# Patient Record
Sex: FEMALE | Race: WHITE | NOT HISPANIC OR LATINO | Employment: FULL TIME | URBAN - METROPOLITAN AREA
[De-identification: names, ages, dates, MRNs, and addresses within clinical notes are randomized per-mention and may not be internally consistent; named-entity substitution may affect disease eponyms.]

---

## 2017-03-29 DIAGNOSIS — Z01.419 ENCOUNTER FOR GYNECOLOGICAL EXAMINATION WITHOUT ABNORMAL FINDING: ICD-10-CM

## 2017-03-29 RX ORDER — NORGESTIMATE AND ETHINYL ESTRADIOL 0.25-0.035
1 KIT ORAL DAILY
Qty: 112 TABLET | Refills: 0 | Status: CANCELLED | OUTPATIENT
Start: 2017-03-29

## 2017-03-29 NOTE — TELEPHONE ENCOUNTER
Pending Prescriptions:                       Disp   Refills    norgestimate-ethinyl estradiol (ORTHO-CYC*112 ta*0            Sig: Take 1 tablet by mouth daily Active tablets for           prevention of menses    Last OV was 4/5/16. Due for AE. TC to patient. LMTC.   Mily Hernandez, RN-BSN

## 2017-03-30 NOTE — TELEPHONE ENCOUNTER
2nd request received from pharmacy. TC to patient on number listed on refill request [phone]. Scheduled AE for 5/16 at 3:30pm. Pt states that she doesn't need a refill at this time, but she will check and call back if she needs it.   Mily Hernandez, RN-BSN

## 2017-05-16 ENCOUNTER — OFFICE VISIT (OUTPATIENT)
Dept: OBGYN | Facility: CLINIC | Age: 42
End: 2017-05-16
Payer: COMMERCIAL

## 2017-05-16 VITALS
BODY MASS INDEX: 19.99 KG/M2 | TEMPERATURE: 98 F | SYSTOLIC BLOOD PRESSURE: 128 MMHG | DIASTOLIC BLOOD PRESSURE: 78 MMHG | WEIGHT: 120 LBS | HEIGHT: 65 IN

## 2017-05-16 DIAGNOSIS — Z01.419 ENCOUNTER FOR GYNECOLOGICAL EXAMINATION WITHOUT ABNORMAL FINDING: Primary | ICD-10-CM

## 2017-05-16 PROCEDURE — 99395 PREV VISIT EST AGE 18-39: CPT | Performed by: OBSTETRICS & GYNECOLOGY

## 2017-05-16 PROCEDURE — 87624 HPV HI-RISK TYP POOLED RSLT: CPT | Performed by: OBSTETRICS & GYNECOLOGY

## 2017-05-16 PROCEDURE — G0145 SCR C/V CYTO,THINLAYER,RESCR: HCPCS | Performed by: OBSTETRICS & GYNECOLOGY

## 2017-05-16 RX ORDER — ETHYNODIOL DIACETATE AND ETHINYL ESTRADIOL 1 MG-35MCG
1 KIT ORAL DAILY
Qty: 84 TABLET | Refills: 3 | Status: SHIPPED | OUTPATIENT
Start: 2017-05-16 | End: 2018-04-16

## 2017-05-16 NOTE — PROGRESS NOTES
Ashia is a 41 year old  female who presents for annual exam.     Menses are regular q 28-30 days and normal lasting 5 days.  Menses flow: normal.  No LMP recorded.. Using oral contraceptives for contraception.  She is not currently considering pregnancy.  Besides routine health maintenance,  she would like to discuss BC options.   Has tried to do continuous therapy with this pill and will have bleeding.  Would like to try a different one.  Not having the spotting that was going on prior so happy about that.   Endocrine doing thyroid stuff.  Kids are now 8 and 7 y/o and doing well.   GYNECOLOGIC HISTORY:  Menarche  Ashia is sexually active with 1male partner(s) and is currently in monogamous relationship.    History sexually transmitted infections:No STD history  STI testing offered?  Declined  [place]RI[place] exposure: Unknown  History of abnormal Pap smear: NO - age 30- 65 PAP every 3 years recommended  Family history of breast CA: Yes (Please explain): mgm  Family history of uterine/ovarian CA: No    Family history of colon CA: No    HEALTH MAINTENANCE:  Cholesterol: (  Cholesterol   Date Value Ref Range Status   2015 200 (H) <200 mg/dL Final     Comment:     LDL Cholesterol is the primary guide to therapy.   The NCEP recommends further evaluation of: patients with cholesterol greater   than 200 mg/dL if additional risk factors are present, cholesterol greater   than   240 mg/dL, triglycerides greater than 150 mg/dL, or HDL less than 40 mg/dL.     10/17/2005 174 0 - 200 mg/dL Final     Comment:     Cholesterol Reference Range:   <200  The NCEP recommends further         evaluation of:         1.  Patients with cholesterol             greater than 200 mg/dL             if additional risk factors             are present.         2.  All patients with a             cholesterol greater than             240 mg/dL.    History of abnormal lipids: Yes  Mammo: na . History of abnormal Mammo: Not applicable.  Regular  "Self Breast Exams: Yes  Calcium/Vitamin D intake: source:  dairy, dietary supplement(s) Adequate? Yes  TSH: (  TSH   Date Value Ref Range Status   2015 1.16 0.40 - 4.00 mU/L Final     Comment:     Effective 2014, the reference range for this assay has changed to reflect   new instrumentation/methodology.      )  Pap; (  Lab Results   Component Value Date    PAP NIL 2013    PAP NIL 2010    PAP NIL 2008    )    HISTORY:  Obstetric History       T2      TAB0   SAB1   E0   M0   L2       # Outcome Date GA Lbr Walt/2nd Weight Sex Delivery Anes PTL Lv   3 Term 10/01/10 39w4d  6 lb 4 oz (2.835 kg) F   N Y      Name: Ibeth      Apgar1:  9                Apgar5: 9   2 Term 08 38w5d  5 lb 7 oz (2.466 kg) M  EPI N Y      Name: Darek      Apgar1:  8                Apgar5: 9   1 SAB                 Past Medical History:   Diagnosis Date     Allergic rhinitis, cause unspecified      Chronic lymphocytic thyroiditis      Corticoadrenal insufficiency     Dr. Freddie AGUILAR of [place] [facility]     Extrinsic asthma, unspecified      Other chronic allergic conjunctivitis     Allergic conjunctivitis     Other forms of retinal detachment(361.89)      Unspecified hypothyroidism      Past Surgical History:   Procedure Laterality Date     addisonian crisis       C REPAIR DETACH RETINA,SCLERAL BUCKLE       HC TOOTH EXTRACTION W/FORCEP       Family History   Problem Relation Age of Onset     Hypertension Mother      CANCER Father      basal cell cancer     CEREBROVASCULAR DISEASE Father      Cardiovascular Maternal Grandmother      \"heart troubles\"     Breast Cancer Maternal Grandmother      Asthma Paternal Grandmother      CEREBROVASCULAR DISEASE Paternal Grandmother      Prostate Cancer Paternal Grandfather      Asthma Sister      Asthma Sister      DIABETES No family hx of      Social History     Social History     Marital status:      Spouse name: Emmett     Number of children: 2 " "    Years of education: N/A     Occupational History      [employer]     Social History Main Topics     Smoking status: Never Smoker     Smokeless tobacco: Never Used     Alcohol use Yes      Comment: 1-2 per week     Drug use: No     Sexual activity: Yes     Partners: Female, Male     Birth control/ protection: Condom, OCP     Other Topics Concern     Not on file     Social History Narrative       Current Outpatient Prescriptions:      norgestimate-ethinyl estradiol (ORTHO-CYCLEN, SPRINTEC) 0.25-35 MG-MCG per tablet, Take 1 tablet by mouth daily Active tablets for prevention of menses, Disp: 112 tablet, Rfl: 3     levothyroxine (SYNTHROID) 125 MCG tablet, Take 1 tablet (125 mcg) by mouth daily, Disp: 90 tablet, Rfl: 3     hydrocortisone (CORTEF) 10 MG tablet, Take 10 mg by mouth daily. 10mg AM 10mg Lunch 5mg at Dinner, Disp: , Rfl:      Cholecalciferol (VITAMIN D) 2000 UNIT tablet, Take 1 tablet by mouth daily., Disp: , Rfl:      FLUDROCORTISONE ACETATE 0.1 MG OR TABS, 1 TABLET DAILY, Disp: 30, Rfl: 0     WOMENS DAILY MULTIVITAMIN OR TABS, 1 TABLET DAILY, Disp: 30, Rfl: 0     Allergies   Allergen Reactions     Penicillins      Sulfa Drugs        Past medical, surgical, social and family history were reviewed and updated in [facility].      EXAM:  /78  Temp 98  F (36.7  C) (Oral)  Ht 5' 4.5\" (1.638 m)  Wt 120 lb (54.4 kg)  BMI 20.28 kg/m2   BMI: Body mass index is 20.28 kg/(m^2).  Constitutional: healthy, alert and no distress  Head: Normocephalic. No masses, lesions, tenderness or abnormalities  Neck: Neck supple. Trachea midline. No adenopathy. Thyroid symmetric, normal size.   Cardiovascular: RRR.   Respiratory: Negative.   Breast: Breasts reveal mild symmetric fibrocystic densities, but there are no dominant, discrete, fixed or suspicious masses found.  Gastrointestinal: Abdomen soft, non-tender, non-distended. No masses, organomegaly.  :  Vulva:  No external lesions, normal female hair distribution, no " inguinal adenopathy.    Urethra:  Midline, non-tender, well supported, no discharge  Vagina:  Moist, pink, no abnormal discharge, no lesions  Uterus:  Normal size , non-tender, freely mobile  Ovaries:  No masses appreciated, non-tender, mobile  Rectal Exam: deferred  Musculoskeletal: extremities normal  Skin: no suspicious lesions or rashes  Psychiatric: Affect appropriate, cooperative,mentation appears normal.     COUNSELING:   Reviewed preventive health counseling, as reflected in patient instructions       Contraception   reports that she has never smoked. She has never used smokeless tobacco.    Body mass index is 20.28 kg/(m^2).    FRAX Risk Assessment    ASSESSMENT:  41 year old female with satisfactory annual exam  (Z01.419) Encounter for gynecological examination without abnormal finding  (primary encounter diagnosis)  Comment: OCP  Plan: HPV High Risk Types DNA Cervical, Pap imaged         thin layer screen with HPV - recommended age 30        - 65 years (select HPV order below),         ethynodiol-ethinyl estradiol (KELNOR) 1-35         MG-MCG per tablet        Discussed sending pap smear for HPV typing as well and that if both pap and HPV are negative, then can have q5 year pap smears.    Will try a different OCP to see if she can do more continuous therapy.  Take normal way first month and then see if can stack weeks.  If wants to try a different one can do that too.  Otherwise can always go back to [facility] if just doing monthly withdrawals.     Plan fasting labs next year.       Eveline Lopez MD

## 2017-05-16 NOTE — MR AVS SNAPSHOT
"              After Visit Summary   5/16/2017    Ashia Ying    MRN: [id number]           Patient Information     Date Of Birth          1975        Visit Information        Provider Department      5/16/2017 3:30 PM Eveline Lopez MD [facility]        Today's Diagnoses     Encounter for gynecological examination without abnormal finding    -  1       Follow-ups after your visit        Who to contact     If you have questions or need follow up information about today's clinic visit or your schedule please contact [facility] directly at [phone].  Normal or non-critical lab and imaging results will be communicated to you by Adjudicahart, letter or phone within 4 business days after the clinic has received the results. If you do not hear from us within 7 days, please contact the clinic through Adjudicahart or phone. If you have a critical or abnormal lab result, we will notify you by phone as soon as possible.  Submit refill requests through CosmosID or call your pharmacy and they will forward the refill request to us. Please allow 3 business days for your refill to be completed.          Additional Information About Your Visit        MyChart Information     [employer] gives you secure access to your electronic health record. If you see a primary care provider, you can also send messages to your care team and make appointments. If you have questions, please call your primary care clinic.  If you do not have a primary care provider, please call [phone] and they will assist you.        Care EveryWhere ID     This is your Care EveryWhere ID. This could be used by other organizations to access your [facility] medical records  [id number]        Your Vitals Were     Temperature Height BMI (Body Mass Index)             98  F (36.7  C) (Oral) 5' 4.5\" (1.638 m) 20.28 kg/m2          Blood Pressure from Last 3 Encounters:   05/16/17 128/78   04/05/16 134/81   03/23/15 " 132/85    Weight from Last 3 Encounters:   05/16/17 120 lb (54.4 kg)   04/05/16 117 lb (53.1 kg)   03/23/15 121 lb 8 oz (55.1 kg)              We Performed the Following     HPV High Risk Types DNA Cervical     Pap imaged thin layer screen with HPV - recommended age 30 - 65 years (select HPV order below)          Today's Medication Changes          These changes are accurate as of: 5/16/17 11:59 PM.  If you have any questions, ask your nurse or doctor.               Start taking these medicines.        Dose/Directions    ethynodiol-ethinyl estradiol 1-35 MG-MCG per tablet   Commonly known as:  KELNOR   Used for:  Encounter for gynecological examination without abnormal finding   Started by:  Eveline Lopez MD        Dose:  1 tablet   Take 1 tablet by mouth daily   Quantity:  84 tablet   Refills:  3            Where to get your medicines      These medications were sent to [id number] [facility] - [place], [place] - [address] [place] [address] [address], [place] [address]     Phone:  [phone]     ethynodiol-ethinyl estradiol 1-35 MG-MCG per tablet                Primary Care Provider Office Phone # Fax #    Diana Marcel Cote [phone]       [address] [address]        Thank you!     Thank you for choosing [facility]  for your care. Our goal is always to provide you with excellent care. Hearing back from our patients is one way we can continue to improve our services. Please take a few minutes to complete the written survey that you may receive in the mail after your visit with us. Thank you!             Your Updated Medication List - Protect others around you: Learn how to safely use, store and throw away your medicines at www.disposemymeds.org.          This list is accurate as of: 5/16/17 11:59 PM.  Always use your most recent med list.                   Brand Name Dispense Instructions for use     ethynodiol-ethinyl estradiol 1-35 MG-MCG per tablet    KELNOR    84 tablet    Take 1 tablet by mouth daily       fludrocortisone 0.1 MG tablet    FLORINEF    30    1 TABLET DAILY       hydrocortisone 10 MG tablet    CORTEF     Take 10 mg by mouth daily. 10mg AM 10mg Lunch 5mg at Dinner       levothyroxine 125 MCG tablet    SYNTHROID    90 tablet    Take 1 tablet (125 mcg) by mouth daily       norgestimate-ethinyl estradiol 0.25-35 MG-MCG per tablet    ORTHO-CYCLEN, SPRINTEC    112 tablet    Take 1 tablet by mouth daily Active tablets for prevention of menses       vitamin D 2000 UNITS tablet      Take 1 tablet by mouth daily.       WOMENS DAILY MULTIVITAMIN Tabs     30    1 TABLET DAILY

## 2017-05-18 LAB
COPATH REPORT: NORMAL
PAP: NORMAL

## 2017-05-22 LAB
FINAL DIAGNOSIS: NORMAL
HPV HR 12 DNA CVX QL NAA+PROBE: NEGATIVE
HPV16 DNA SPEC QL NAA+PROBE: NEGATIVE
HPV18 DNA SPEC QL NAA+PROBE: NEGATIVE
SPECIMEN DESCRIPTION: NORMAL

## 2018-04-10 DIAGNOSIS — Z01.419 ENCOUNTER FOR GYNECOLOGICAL EXAMINATION WITHOUT ABNORMAL FINDING: ICD-10-CM

## 2018-04-10 RX ORDER — NORGESTIMATE AND ETHINYL ESTRADIOL 0.25-0.035
1 KIT ORAL DAILY
Qty: 112 TABLET | Refills: 0 | Status: SHIPPED | OUTPATIENT
Start: 2018-04-10 | End: 2018-04-16

## 2018-04-10 NOTE — TELEPHONE ENCOUNTER
Pharmacy sent refill request for OCP.  Pt due for AE 5/2018.  Appt is scheduled with [facility].  Refill sent per protocol.  Treva Darling RN

## 2018-04-16 RX ORDER — ETHYNODIOL DIACETATE AND ETHINYL ESTRADIOL 1 MG-35MCG
1 KIT ORAL DAILY
Qty: 84 TABLET | Refills: 0 | Status: SHIPPED | OUTPATIENT
Start: 2018-04-16 | End: 2018-06-08

## 2018-04-16 NOTE — TELEPHONE ENCOUNTER
Pt had two OCPs listed in her profile.  Re-sending 2nd rx per pharmacy request.  Treva Darling RN

## 2018-06-08 ENCOUNTER — OFFICE VISIT (OUTPATIENT)
Dept: OBGYN | Facility: CLINIC | Age: 43
End: 2018-06-08
Payer: COMMERCIAL

## 2018-06-08 VITALS
WEIGHT: 121.6 LBS | DIASTOLIC BLOOD PRESSURE: 76 MMHG | BODY MASS INDEX: 20.26 KG/M2 | HEIGHT: 65 IN | SYSTOLIC BLOOD PRESSURE: 118 MMHG | OXYGEN SATURATION: 99 % | HEART RATE: 65 BPM

## 2018-06-08 DIAGNOSIS — Z01.419 ENCOUNTER FOR GYNECOLOGICAL EXAMINATION WITHOUT ABNORMAL FINDING: Primary | ICD-10-CM

## 2018-06-08 DIAGNOSIS — Z13.220 SCREENING FOR LIPOID DISORDERS: ICD-10-CM

## 2018-06-08 DIAGNOSIS — Z13.0 SCREENING FOR IRON DEFICIENCY ANEMIA: ICD-10-CM

## 2018-06-08 DIAGNOSIS — Z13.1 SCREENING FOR DIABETES MELLITUS: ICD-10-CM

## 2018-06-08 DIAGNOSIS — Z83.719 FAMILY HISTORY OF COLONIC POLYPS: ICD-10-CM

## 2018-06-08 LAB
ALBUMIN SERPL-MCNC: 3.6 G/DL (ref 3.4–5)
ALP SERPL-CCNC: 46 U/L (ref 40–150)
ALT SERPL W P-5'-P-CCNC: 25 U/L (ref 0–50)
ANION GAP SERPL CALCULATED.3IONS-SCNC: 7 MMOL/L (ref 3–14)
AST SERPL W P-5'-P-CCNC: 16 U/L (ref 0–45)
BILIRUB SERPL-MCNC: 0.4 MG/DL (ref 0.2–1.3)
BUN SERPL-MCNC: 9 MG/DL (ref 7–30)
CALCIUM SERPL-MCNC: 9.1 MG/DL (ref 8.5–10.1)
CHLORIDE SERPL-SCNC: 102 MMOL/L (ref 94–109)
CHOLEST SERPL-MCNC: 171 MG/DL
CO2 SERPL-SCNC: 29 MMOL/L (ref 20–32)
CREAT SERPL-MCNC: 0.84 MG/DL (ref 0.52–1.04)
ERYTHROCYTE [DISTWIDTH] IN BLOOD BY AUTOMATED COUNT: 14.6 % (ref 10–15)
GFR SERPL CREATININE-BSD FRML MDRD: 75 ML/MIN/1.7M2
GLUCOSE SERPL-MCNC: 82 MG/DL (ref 70–99)
HCT VFR BLD AUTO: 41.9 % (ref 35–47)
HDLC SERPL-MCNC: 77 MG/DL
HGB BLD-MCNC: 13.4 G/DL (ref 11.7–15.7)
LDLC SERPL CALC-MCNC: 61 MG/DL
MCH RBC QN AUTO: 30 PG (ref 26.5–33)
MCHC RBC AUTO-ENTMCNC: 32 G/DL (ref 31.5–36.5)
MCV RBC AUTO: 94 FL (ref 78–100)
NONHDLC SERPL-MCNC: 94 MG/DL
PLATELET # BLD AUTO: 238 10E9/L (ref 150–450)
POTASSIUM SERPL-SCNC: 3.6 MMOL/L (ref 3.4–5.3)
PROT SERPL-MCNC: 7.3 G/DL (ref 6.8–8.8)
RBC # BLD AUTO: 4.47 10E12/L (ref 3.8–5.2)
SODIUM SERPL-SCNC: 138 MMOL/L (ref 133–144)
TRIGL SERPL-MCNC: 163 MG/DL
WBC # BLD AUTO: 14.1 10E9/L (ref 4–11)

## 2018-06-08 PROCEDURE — 80053 COMPREHEN METABOLIC PANEL: CPT | Performed by: OBSTETRICS & GYNECOLOGY

## 2018-06-08 PROCEDURE — 99396 PREV VISIT EST AGE 40-64: CPT | Performed by: OBSTETRICS & GYNECOLOGY

## 2018-06-08 PROCEDURE — 80061 LIPID PANEL: CPT | Performed by: OBSTETRICS & GYNECOLOGY

## 2018-06-08 PROCEDURE — 36415 COLL VENOUS BLD VENIPUNCTURE: CPT | Performed by: OBSTETRICS & GYNECOLOGY

## 2018-06-08 PROCEDURE — 85027 COMPLETE CBC AUTOMATED: CPT | Performed by: OBSTETRICS & GYNECOLOGY

## 2018-06-08 RX ORDER — ETHYNODIOL DIACETATE AND ETHINYL ESTRADIOL 1 MG-35MCG
1 KIT ORAL DAILY
Qty: 112 TABLET | Refills: 3 | Status: SHIPPED | OUTPATIENT
Start: 2018-06-08 | End: 2019-08-21

## 2018-06-08 NOTE — PROGRESS NOTES
Ashia is a 42 year old  female who presents for annual exam.     Menses are regular q 28-30 days and normal lasting 6 days.  Menses flow: normal.  No LMP recorded.. Using oral contraceptives for contraception.  She is not currently considering pregnancy.  Besides routine health maintenance, she has no other health concerns today .  Tried pill stacking and did not work so taking regular way.  Kids are now 10 and 8 y/o. Doing well.   Sister had colonoscopy due to IBS and had polyps found at age 42 so was recc her siblings also get one. Endocrine is still following her thyroid but interested if I can do that if stable in the future.   GYNECOLOGIC HISTORY:  Menarche:   Ashia is sexually active with 1male partner(s) and is currently in monogamous relationship.    History sexually transmitted infections:No STD history  STI testing offered?  Declined  [place] exposure: Unknown  History of abnormal Pap smear: NO - age 30- 65 PAP every 3 years recommended  Family history of breast CA: Yes (Please explain): mgm  Family history of uterine/ovarian CA: No    Family history of colon CA: No    HEALTH MAINTENANCE:  Cholesterol: (  Cholesterol   Date Value Ref Range Status   2015 200 (H) <200 mg/dL Final     Comment:     LDL Cholesterol is the primary guide to therapy.   The NCEP recommends further evaluation of: patients with cholesterol greater   than 200 mg/dL if additional risk factors are present, cholesterol greater   than   240 mg/dL, triglycerides greater than 150 mg/dL, or HDL less than 40 mg/dL.     10/17/2005 174 0 - 200 mg/dL Final     Comment:     Cholesterol Reference Range:   <200  The NCEP recommends further         evaluation of:         1.  Patients with cholesterol             greater than 200 mg/dL             if additional risk factors             are present.         2.  All patients with a             cholesterol greater than             240 mg/dL.    History of abnormal lipids: Yes  Mammo: na .  "History of abnormal Mammo: Not applicable.  Regular Self Breast Exams: Yes  Calcium/Vitamin D intake: source:  dairy, vit d and calcium Adequate? Yes  TSH: (  TSH   Date Value Ref Range Status   2015 1.16 0.40 - 4.00 mU/L Final     Comment:     Effective 2014, the reference range for this assay has changed to reflect   new instrumentation/methodology.      )  Pap; (  Lab Results   Component Value Date    PAP NIL 2017    PAP NIL 2013    PAP NIL 2010    )    HISTORY:  Obstetric History       T2      L2     SAB1   TAB0   Ectopic0   Multiple0   Live Births2       # Outcome Date GA Lbr Walt/2nd Weight Sex Delivery Anes PTL Lv   3 Term 10/01/10 39w4d  6 lb 4 oz (2.835 kg) F   N DELBERT      Name: Ibeth      Apgar1:  9                Apgar5: 9   2 Term 08 38w5d  5 lb 7 oz (2.466 kg) M  EPI N DELBERT      Name: Darek      Apgar1:  8                Apgar5: 9   1 SAB                 Past Medical History:   Diagnosis Date     Allergic rhinitis, cause unspecified      Chronic lymphocytic thyroiditis      Corticoadrenal insufficiency     Dr. Freddie AGUILAR of [place] [facility]     Extrinsic asthma, unspecified      Other chronic allergic conjunctivitis     Allergic conjunctivitis     Other forms of retinal detachment(361.89)      Unspecified hypothyroidism      Past Surgical History:   Procedure Laterality Date     addisonian crisis       C REPAIR DETACH RETINA,SCLERAL BUCKLE       HC TOOTH EXTRACTION W/FORCEP       Family History   Problem Relation Age of Onset     Hypertension Mother      CANCER Father      basal cell cancer     CEREBROVASCULAR DISEASE Father      Cardiovascular Maternal Grandmother      \"heart troubles\"     Breast Cancer Maternal Grandmother      Asthma Paternal Grandmother      CEREBROVASCULAR DISEASE Paternal Grandmother      Prostate Cancer Paternal Grandfather      Asthma Sister      Asthma Sister      DIABETES No family hx of      Social History     Social " "History     Marital status:      Spouse name: Emmett     Number of children: 2     Years of education: N/A     Occupational History      [employer]     Social History Main Topics     Smoking status: Never Smoker     Smokeless tobacco: Never Used     Alcohol use Yes      Comment: 1-2 per week     Drug use: No     Sexual activity: Yes     Partners: Male     Birth control/ protection: Condom, OCP     Other Topics Concern     Not on file     Social History Narrative       Current Outpatient Prescriptions:      Cholecalciferol (VITAMIN D) 2000 UNIT tablet, Take 1 tablet by mouth daily., Disp: , Rfl:      ethynodiol-ethinyl estradiol (KELNOR) 1-35 MG-MCG per tablet, Take 1 tablet by mouth daily, Disp: 84 tablet, Rfl: 0     FLUDROCORTISONE ACETATE 0.1 MG OR TABS, 1 TABLET DAILY, Disp: 30, Rfl: 0     hydrocortisone (CORTEF) 10 MG tablet, Take 10 mg by mouth daily. 10mg AM 10mg Lunch 5mg at Dinner, Disp: , Rfl:      levothyroxine (SYNTHROID) 125 MCG tablet, Take 1 tablet (125 mcg) by mouth daily, Disp: 90 tablet, Rfl: 3     WOMENS DAILY MULTIVITAMIN OR TABS, 1 TABLET DAILY, Disp: 30, Rfl: 0     Allergies   Allergen Reactions     Penicillins      Sulfa Drugs        Past medical, surgical, social and family history were reviewed and updated in [facility].      EXAM:  /76  Pulse 65  Ht 5' 4.5\" (1.638 m)  Wt 121 lb 9.6 oz (55.2 kg)  SpO2 99%  BMI 20.55 kg/m2   BMI: Body mass index is 20.55 kg/(m^2).  Constitutional: healthy, alert and no distress  Head: Normocephalic. No masses, lesions, tenderness or abnormalities  Neck: Neck supple. Trachea midline. No adenopathy. Thyroid symmetric, normal size.   Cardiovascular: RRR.   Respiratory: Negative.   Breast: Breasts reveal mild symmetric fibrocystic densities, but there are no dominant, discrete, fixed or suspicious masses found.  Gastrointestinal: Abdomen soft, non-tender, non-distended. No masses, organomegaly.  :  Vulva:  No external lesions, normal female hair " distribution, no inguinal adenopathy.    Urethra:  Midline, non-tender, well supported, no discharge  Vagina:  Moist, pink, no abnormal discharge, no lesions  Uterus:  Normal size , non-tender, freely mobile  Ovaries:  No masses appreciated, non-tender, mobile  Rectal Exam: deferred  Musculoskeletal: extremities normal  Skin: no suspicious lesions or rashes  Psychiatric: Affect appropriate, cooperative,mentation appears normal.     COUNSELING:   Reviewed preventive health counseling, as reflected in patient instructions       Contraception   reports that she has never smoked. She has never used smokeless tobacco.    Body mass index is 20.55 kg/(m^2).    FRAX Risk Assessment    ASSESSMENT:  42 year old female with satisfactory annual exam  (Z01.419) Encounter for gynecological examination without abnormal finding  (primary encounter diagnosis)  Comment: OCP  Plan: ethynodiol-ethinyl estradiol (KELNOR) 1-35         MG-MCG per tablet        Refill done with continuous therapy discussed.     (Z13.220) Screening for lipoid disorders  Plan: Lipid Profile        Fasting today.    (Z13.1) Screening for diabetes mellitus  Plan: Comprehensive metabolic panel        Fasting today.    (Z13.0) Screening for iron deficiency anemia  Plan: CBC with platelets        Fasting today.    (Z83.71) Family history of colonic polyps  Comment: sister with polyp age 42  Plan: GASTROENTEROLOGY ADULT REF PROCEDURE ONLY Other        (lives in [facility])        Referral done for colonoscopy since has sister with polyp.        Eveline Lopez MD

## 2018-06-08 NOTE — MR AVS SNAPSHOT
After Visit Summary   6/8/2018    Ashia Ying    MRN: [id number]           Patient Information     Date Of Birth          1975        Visit Information        Provider Department      6/8/2018 8:00 AM Eveline Lopez MD [facility]        Today's Diagnoses     Encounter for gynecological examination without abnormal finding    -  1    Screening for lipoid disorders        Screening for diabetes mellitus        Screening for iron deficiency anemia        Family history of colonic polyps           Follow-ups after your visit        Additional Services     GASTROENTEROLOGY ADULT REF PROCEDURE ONLY Other (lives in [facility])       Last Lab Result: Creatinine (mg/dL)       Date                     Value                 11/24/2012               0.91             ----------  Body mass index is 20.55 kg/(m^2).     Needed:  No  Language:  English    Patient will be contacted to schedule procedure.     Please be aware that coverage of these services is subject to the terms and limitations of your health insurance plan.  Call member services at your health plan with any benefit or coverage questions.  Any procedures must be performed at a [facility] facility OR coordinated by your clinic's referral office.    Please bring the following with you to your appointment:    (1) Any X-Rays, CTs or MRIs which have been performed.  Contact the facility where they were done to arrange for  prior to your scheduled appointment.    (2) List of current medications   (3) This referral request   (4) Any documents/labs given to you for this referral                  Who to contact     If you have questions or need follow up information about today's clinic visit or your schedule please contact [facility] [place] at [phone].  Normal or non-critical lab and imaging results will be communicated to you by MyChart, letter or phone within 4 business days after  "the clinic has received the results. If you do not hear from us within 7 days, please contact the clinic through [facility] or phone. If you have a critical or abnormal lab result, we will notify you by phone as soon as possible.  Submit refill requests through [employer] or call your pharmacy and they will forward the refill request to us. Please allow 3 business days for your refill to be completed.          Additional Information About Your Visit        [facility] Information     [facility] gives you secure access to your electronic health record. If you see a primary care provider, you can also send messages to your care team and make appointments. If you have questions, please call your primary care clinic.  If you do not have a primary care provider, please call [phone] and they will assist you.        Care EveryWhere ID     This is your Care EveryWhere ID. This could be used by other organizations to access your [facility] medical records  [id number]        Your Vitals Were     Pulse Height Last Period Pulse Oximetry BMI (Body Mass Index)       65 5' 4.5\" (1.638 m) 05/22/2018 99% 20.55 kg/m2        Blood Pressure from Last 3 Encounters:   06/08/18 118/76   05/16/17 128/78   04/05/16 134/81    Weight from Last 3 Encounters:   06/08/18 121 lb 9.6 oz (55.2 kg)   05/16/17 120 lb (54.4 kg)   04/05/16 117 lb (53.1 kg)              We Performed the Following     CBC with platelets     Comprehensive metabolic panel     GASTROENTEROLOGY ADULT REF PROCEDURE ONLY Other (lives in [facility])     Lipid Profile          Today's Medication Changes          These changes are accurate as of 6/8/18  8:24 AM.  If you have any questions, ask your nurse or doctor.               These medicines have changed or have updated prescriptions.        Dose/Directions    ethynodiol-ethinyl estradiol 1-35 MG-MCG per tablet   Commonly known as:  JAROD   This may have changed:  additional instructions   Used for:  Encounter for gynecological " examination without abnormal finding   Changed by:  Eveline Lopez MD        Dose:  1 tablet   Take 1 tablet by mouth daily Continuous therapy for prevention of menses   Quantity:  112 tablet   Refills:  3            Where to get your medicines      These medications were sent to [place] [address] [facility] - [place], [place] - [address]  [address], [place] [address]     Phone:  [phone]     ethynodiol-ethinyl estradiol 1-35 MG-MCG per tablet                Primary Care Provider Office Phone # Fax #    Diana Marcel Cote [phone]       [address] [facility] [address]        Equal Access to Services     [facility] : Sammi Haskins, waclaus argueta, qaybta kaalmada shirin, mima starr. So [facility] [phone].    ATENCIÓN: Si habla español, tiene a milner disposición servicios gratuitos de asistencia lingüística. [facility] [phone].    We comply with applicable federal civil rights laws and [place] laws. We do not discriminate on the basis of race, color, national origin, age, disability, sex, sexual orientation, or gender identity.            Thank you!     Thank you for choosing [facility]  for your care. Our goal is always to provide you with excellent care. Hearing back from our patients is one way we can continue to improve our services. Please take a few minutes to complete the written survey that you may receive in the mail after your visit with us. Thank you!             Your Updated Medication List - Protect others around you: Learn how to safely use, store and throw away your medicines at www.disposemymeds.org.          This list is accurate as of 6/8/18  8:24 AM.  Always use your most recent med list.                   Brand Name Dispense Instructions for use Diagnosis    ethynodiol-ethinyl estradiol 1-35 MG-MCG per tablet    KELNOR    112 tablet    Take 1 tablet by  mouth daily Continuous therapy for prevention of menses    Encounter for gynecological examination without abnormal finding       fludrocortisone 0.1 MG tablet    FLORINEF    30    1 TABLET DAILY        hydrocortisone 10 MG tablet    CORTEF     Take 10 mg by mouth daily. 10mg AM 10mg Lunch 5mg at Dinner        levothyroxine 125 MCG tablet    SYNTHROID    90 tablet    Take 1 tablet (125 mcg) by mouth daily    Unspecified hypothyroidism       vitamin D 2000 units tablet      Take 1 tablet by mouth daily.        WOMENS DAILY MULTIVITAMIN Tabs     30    1 TABLET DAILY

## 2018-06-28 DIAGNOSIS — Z01.419 ENCOUNTER FOR ROUTINE GYNECOLOGICAL EXAMINATION: ICD-10-CM

## 2018-06-28 LAB
ERYTHROCYTE [DISTWIDTH] IN BLOOD BY AUTOMATED COUNT: 13.5 % (ref 10–15)
HCT VFR BLD AUTO: 43.6 % (ref 35–47)
HGB BLD-MCNC: 14.2 G/DL (ref 11.7–15.7)
MCH RBC QN AUTO: 30.7 PG (ref 26.5–33)
MCHC RBC AUTO-ENTMCNC: 32.6 G/DL (ref 31.5–36.5)
MCV RBC AUTO: 94 FL (ref 78–100)
PLATELET # BLD AUTO: 281 10E9/L (ref 150–450)
RBC # BLD AUTO: 4.62 10E12/L (ref 3.8–5.2)
WBC # BLD AUTO: 13.9 10E9/L (ref 4–11)

## 2018-06-28 PROCEDURE — 85027 COMPLETE CBC AUTOMATED: CPT | Performed by: OBSTETRICS & GYNECOLOGY

## 2018-06-28 PROCEDURE — 36415 COLL VENOUS BLD VENIPUNCTURE: CPT | Performed by: OBSTETRICS & GYNECOLOGY

## 2019-05-27 ENCOUNTER — OFFICE VISIT - HEALTHEAST (OUTPATIENT)
Dept: FAMILY MEDICINE | Facility: CLINIC | Age: 44
End: 2019-05-27

## 2019-05-27 ENCOUNTER — COMMUNICATION - HEALTHEAST (OUTPATIENT)
Dept: TELEHEALTH | Facility: CLINIC | Age: 44
End: 2019-05-27

## 2019-05-27 DIAGNOSIS — H65.192 OTHER ACUTE NONSUPPURATIVE OTITIS MEDIA OF LEFT EAR, RECURRENCE NOT SPECIFIED: ICD-10-CM

## 2019-05-27 DIAGNOSIS — J20.9 ACUTE BRONCHITIS, UNSPECIFIED ORGANISM: ICD-10-CM

## 2019-06-17 DIAGNOSIS — Z01.419 ENCOUNTER FOR GYNECOLOGICAL EXAMINATION WITHOUT ABNORMAL FINDING: ICD-10-CM

## 2019-06-17 RX ORDER — ETHYNODIOL DIACETATE AND ETHINYL ESTRADIOL 1 MG-35MCG
1 KIT ORAL DAILY
Qty: 112 TABLET | Refills: 0 | Status: CANCELLED | OUTPATIENT
Start: 2019-06-17

## 2019-06-17 NOTE — TELEPHONE ENCOUNTER
Pharmacy sent refill request for birth control.  Pt due for AE.  LMTC.  Once pt schedules, refill can be sent.  Treva Darling RN

## 2019-08-21 ENCOUNTER — OFFICE VISIT (OUTPATIENT)
Dept: OBGYN | Facility: CLINIC | Age: 44
End: 2019-08-21
Payer: COMMERCIAL

## 2019-08-21 VITALS
SYSTOLIC BLOOD PRESSURE: 112 MMHG | DIASTOLIC BLOOD PRESSURE: 70 MMHG | TEMPERATURE: 98.2 F | WEIGHT: 119 LBS | BODY MASS INDEX: 19.83 KG/M2 | HEIGHT: 65 IN

## 2019-08-21 DIAGNOSIS — Z83.719 FAMILY HISTORY OF COLONIC POLYPS: ICD-10-CM

## 2019-08-21 DIAGNOSIS — Z13.220 SCREENING FOR LIPOID DISORDERS: ICD-10-CM

## 2019-08-21 DIAGNOSIS — E03.9 HYPOTHYROIDISM, UNSPECIFIED TYPE: ICD-10-CM

## 2019-08-21 DIAGNOSIS — Z01.419 ENCOUNTER FOR GYNECOLOGICAL EXAMINATION WITHOUT ABNORMAL FINDING: Primary | ICD-10-CM

## 2019-08-21 LAB
CHOLEST SERPL-MCNC: 207 MG/DL
ERYTHROCYTE [DISTWIDTH] IN BLOOD BY AUTOMATED COUNT: 13.5 % (ref 10–15)
HCT VFR BLD AUTO: 44.5 % (ref 35–47)
HDLC SERPL-MCNC: 93 MG/DL
HGB BLD-MCNC: 14.2 G/DL (ref 11.7–15.7)
LDLC SERPL CALC-MCNC: 74 MG/DL
MCH RBC QN AUTO: 30.3 PG (ref 26.5–33)
MCHC RBC AUTO-ENTMCNC: 31.9 G/DL (ref 31.5–36.5)
MCV RBC AUTO: 95 FL (ref 78–100)
NONHDLC SERPL-MCNC: 114 MG/DL
PLATELET # BLD AUTO: 252 10E9/L (ref 150–450)
RBC # BLD AUTO: 4.68 10E12/L (ref 3.8–5.2)
TRIGL SERPL-MCNC: 202 MG/DL
TSH SERPL DL<=0.005 MIU/L-ACNC: 2.52 MU/L (ref 0.4–4)
WBC # BLD AUTO: 14.5 10E9/L (ref 4–11)

## 2019-08-21 PROCEDURE — 85027 COMPLETE CBC AUTOMATED: CPT | Performed by: OBSTETRICS & GYNECOLOGY

## 2019-08-21 PROCEDURE — 99396 PREV VISIT EST AGE 40-64: CPT | Performed by: OBSTETRICS & GYNECOLOGY

## 2019-08-21 PROCEDURE — 80061 LIPID PANEL: CPT | Performed by: OBSTETRICS & GYNECOLOGY

## 2019-08-21 PROCEDURE — 84443 ASSAY THYROID STIM HORMONE: CPT | Performed by: OBSTETRICS & GYNECOLOGY

## 2019-08-21 PROCEDURE — 36415 COLL VENOUS BLD VENIPUNCTURE: CPT | Performed by: OBSTETRICS & GYNECOLOGY

## 2019-08-21 RX ORDER — LEVOTHYROXINE SODIUM 112 UG/1
112 TABLET ORAL DAILY
COMMUNITY
Start: 2019-08-21 | End: 2022-04-13

## 2019-08-21 RX ORDER — ETHYNODIOL DIACETATE AND ETHINYL ESTRADIOL 1 MG-35MCG
1 KIT ORAL DAILY
Qty: 112 TABLET | Refills: 4 | Status: SHIPPED | OUTPATIENT
Start: 2019-08-21 | End: 2020-12-21

## 2019-08-21 SDOH — HEALTH STABILITY: MENTAL HEALTH: HOW MANY STANDARD DRINKS CONTAINING ALCOHOL DO YOU HAVE ON A TYPICAL DAY?: 1 OR 2

## 2019-08-21 SDOH — HEALTH STABILITY: MENTAL HEALTH: HOW OFTEN DO YOU HAVE A DRINK CONTAINING ALCOHOL?: 2-4 TIMES A MONTH

## 2019-08-21 SDOH — HEALTH STABILITY: MENTAL HEALTH: HOW OFTEN DO YOU HAVE 6 OR MORE DRINKS ON ONE OCCASION?: NEVER

## 2019-08-21 ASSESSMENT — MIFFLIN-ST. JEOR: SCORE: 1202.48

## 2019-08-21 NOTE — PROGRESS NOTES
Ashia is a 43 year old  female who presents for annual exam.     Menses are regular q 28-30 days and normal lasting 6 days.  Menses flow: normal.  Patient's last menstrual period was 2019.. Using oral contraceptives for contraception.  She is not currently considering pregnancy.  Besides routine health maintenance, she has no other health concerns today .  Kids are 11 and 7 y/o now.   Was up at a cabin a week ago and just got back. Has a rash lesion on left LLQ of abdomen and one on back to look at.  GYNECOLOGIC HISTORY:  Menarche:   Ashia is sexually active with 1male partner(s) and is currently in monogamous relationship.    History sexually transmitted infections:No STD history  STI testing offered?  Declined  [place] exposure: Unknown  History of abnormal Pap smear: NO - age 30- 65 PAP every 3 years recommended  Family history of breast CA: Yes (Please explain): mmg  Family history of uterine/ovarian CA: No    Family history of colon CA: Yes (Please explain): mom and sis    HEALTH MAINTENANCE:  Cholesterol: (  Cholesterol   Date Value Ref Range Status   2018 171 <200 mg/dL Final   2015 200 (H) <200 mg/dL Final     Comment:     LDL Cholesterol is the primary guide to therapy.   The NCEP recommends further evaluation of: patients with cholesterol greater   than 200 mg/dL if additional risk factors are present, cholesterol greater   than   240 mg/dL, triglycerides greater than 150 mg/dL, or HDL less than 40 mg/dL.      History of abnormal lipids: No  Mammo: na . History of abnormal Mammo: No.  Regular Self Breast Exams: Yes  Calcium/Vitamin D intake: source:  dairy, vit d and calcium Adequate? Yes  TSH: (  TSH   Date Value Ref Range Status   2015 1.16 0.40 - 4.00 mU/L Final     Comment:     Effective 2014, the reference range for this assay has changed to reflect   new instrumentation/methodology.      )  Pap; (  Lab Results   Component Value Date    PAP NIL 2017    PAP NIL  "2013    PAP NIL 2010    )    HISTORY:  OB History    Para Term  AB Living   3 2 2 0 1 2   SAB TAB Ectopic Multiple Live Births   1 0 0 0 2      # Outcome Date GA Lbr Walt/2nd Weight Sex Delivery Anes PTL Lv   3 Term 10/01/10 39w4d  2.835 kg (6 lb 4 oz) F   N DELBERT      Birth Comments: IOL for IUGR, had prolapsed arm      Name: Ibeth      Apgar1: 9  Apgar5: 9   2 Term 08 38w5d  2.466 kg (5 lb 7 oz) M  EPI N DELBERT      Birth Comments: induced for IUGR      Name: Darek      Apgar1: 8  Apgar5: 9   1 SAB              Past Medical History:   Diagnosis Date     Allergic rhinitis, cause unspecified      Chronic lymphocytic thyroiditis      Corticoadrenal insufficiency     Dr. Freddie AGUILAR of [place] [facility]     Extrinsic asthma, unspecified      Other chronic allergic conjunctivitis     Allergic conjunctivitis     Other forms of retinal detachment(361.89)      Unspecified hypothyroidism      Past Surgical History:   Procedure Laterality Date     addisonian crisis       C REPAIR DETACH RETINA,SCLERAL BUCKLE       HC TOOTH EXTRACTION W/FORCEP       Family History   Problem Relation Age of Onset     Hypertension Mother      Arthritis Mother      Osteopenia Mother      Colon Polyps Mother      Cancer Father         basal cell cancer     Cerebrovascular Disease Father         CVA     Hypertension Father      Other - See Comments Father         stroke     Colon Polyps Sister 42     Depression Sister      Cardiovascular Maternal Grandmother         \"heart troubles\"     Breast Cancer Maternal Grandmother      Osteoporosis Maternal Grandmother      Asthma Paternal Grandmother      Cerebrovascular Disease Paternal Grandmother      Prostate Cancer Paternal Grandfather      Asthma Sister      Asthma Sister      Diabetes No family hx of      Social History     Socioeconomic History     Marital status:      Spouse name: Emmett     Number of children: 2     Years of education: None     Highest " education level: None   Occupational History     Employer: [employer]   Social Needs     Financial resource strain: None     Food insecurity:     Worry: None     Inability: None     Transportation needs:     Medical: None     Non-medical: None   Tobacco Use     Smoking status: Never Smoker     Smokeless tobacco: Never Used   Substance and Sexual Activity     Alcohol use: Yes     Comment: 1-2 per week     Drug use: No     Sexual activity: Yes     Partners: Male     Birth control/protection: OCP, Pill   Lifestyle     Physical activity:     Days per week: None     Minutes per session: None     Stress: None   Relationships     Social connections:     Talks on phone: None     Gets together: None     Attends Christian service: None     Active member of club or organization: None     Attends meetings of clubs or organizations: None     Relationship status: None     Intimate partner violence:     Fear of current or ex partner: None     Emotionally abused: None     Physically abused: None     Forced sexual activity: None   Other Topics Concern     None   Social History Narrative     None       Current Outpatient Medications:      ethynodiol-ethinyl estradiol (KELNOR) 1-35 MG-MCG tablet, Take 1 tablet by mouth daily Continuous therapy for prevention of menses, Disp: 112 tablet, Rfl: 4     levothyroxine (SYNTHROID/LEVOTHROID) 112 MCG tablet, Take 1 tablet (112 mcg) by mouth daily, Disp: , Rfl:      Cholecalciferol (VITAMIN D) 2000 UNIT tablet, Take 1 tablet by mouth daily., Disp: , Rfl:      FLUDROCORTISONE ACETATE 0.1 MG OR TABS, 1 TABLET DAILY, Disp: 30, Rfl: 0     hydrocortisone (CORTEF) 10 MG tablet, Take 10 mg by mouth daily. 10mg AM 10mg Lunch 5mg at Dinner, Disp: , Rfl:      WOMENS DAILY MULTIVITAMIN OR TABS, 1 TABLET DAILY, Disp: 30, Rfl: 0     Allergies   Allergen Reactions     Penicillins      Sulfa Drugs        Past medical, surgical, social and family history were reviewed and updated in [facility].      EXAM:  /70   " Temp 98.2  F (36.8  C) (Oral)   Ht 1.646 m (5' 4.8\")   Wt 54 kg (119 lb)   LMP 08/06/2019   BMI 19.93 kg/m     BMI: Body mass index is 19.93 kg/m .  Constitutional: healthy, alert and no distress  Head: Normocephalic. No masses, lesions, tenderness or abnormalities  Neck: Neck supple. Trachea midline. No adenopathy. Thyroid symmetric, normal size.   Cardiovascular: RRR.   Respiratory: Negative.   Breast: Breasts reveal mild symmetric fibrocystic densities, but there are no dominant, discrete, fixed or suspicious masses found.  Gastrointestinal: Abdomen soft, non-tender, non-distended. No masses, organomegaly.  Approximately 6 x 5 cm erythematous area with elevated bite in the middle.  Same coloration throughout.  Similar site on left shoulder blade.  :  Vulva:  No external lesions, normal female hair distribution, no inguinal adenopathy.    Urethra:  Midline, non-tender, well supported, no discharge  Vagina:  Moist, pink, no abnormal discharge, no lesions  Uterus:  Normal size , non-tender, freely mobile  Ovaries:  No masses appreciated, non-tender, mobile  Rectal Exam: deferred  Musculoskeletal: extremities normal  Skin: no suspicious lesions or rashes  Psychiatric: Affect appropriate, cooperative,mentation appears normal.     COUNSELING:   Reviewed preventive health counseling, as reflected in patient instructions   reports that she has never smoked. She has never used smokeless tobacco.    Body mass index is 19.93 kg/m .    FRAX Risk Assessment    ASSESSMENT:  43 year old female with satisfactory annual exam  (Z01.419) Encounter for gynecological examination without abnormal finding  (primary encounter diagnosis)  Comment: OCP  Plan: CBC with platelets, ethynodiol-ethinyl         estradiol (KELNOR) 1-35 MG-MCG tablet        Refill of OCP was done.  Discussed can switch to a different birth control pill and she declines at this point.  Recheck white blood cell count since was elevated last " year.    Discussed rash looks like spider bite, reassured.    (Z13.220) Screening for lipoid disorders  Comment: elevated prior  Plan: Lipid Profile        Fasting today for lab    (Z83.71) Family history of colonic polyps  Comment: sister, mother and MGF  Plan: GASTROENTEROLOGY ADULT REF PROCEDURE ONLY         [facility] [phone]        Refer for early colonoscopy    (E03.9) Hypothyroidism, unspecified type  Comment: seeing endocrine  Plan: TSH with free T4 reflex, levothyroxine         (SYNTHROID/LEVOTHROID) 112 MCG tablet        Check TSH today since >1 year.    Eveline Lopez MD

## 2019-08-21 NOTE — NURSING NOTE
"Chief Complaint   Patient presents with     Physical       Initial /70   Temp 98.2  F (36.8  C) (Oral)   Ht 1.646 m (5' 4.8\")   Wt 54 kg (119 lb)   LMP 2019   BMI 19.93 kg/m   Estimated body mass index is 19.93 kg/m  as calculated from the following:    Height as of this encounter: 1.646 m (5' 4.8\").    Weight as of this encounter: 54 kg (119 lb).  BP completed using cuff size: regular    Questioned patient about current smoking habits.  Pt. has never smoked.          The following HM Due: mammogram      The following patient reported/Care Every where data was sent to:  P ABSTRACT QUALITY INITIATIVES [[id number]]  na      patient has appointment for today              "

## 2019-11-05 ENCOUNTER — HEALTH MAINTENANCE LETTER (OUTPATIENT)
Age: 44
End: 2019-11-05

## 2020-08-26 DIAGNOSIS — Z01.419 ENCOUNTER FOR GYNECOLOGICAL EXAMINATION WITHOUT ABNORMAL FINDING: ICD-10-CM

## 2020-08-26 RX ORDER — ETHYNODIOL DIACETATE AND ETHINYL ESTRADIOL 1 MG-35MCG
1 KIT ORAL DAILY
Qty: 112 TABLET | Refills: 0 | Status: CANCELLED | OUTPATIENT
Start: 2020-08-26

## 2020-08-26 NOTE — TELEPHONE ENCOUNTER
Pending Prescriptions:                       Disp   Refills    ethynodiol-ethinyl estradiol (KELNOR) 1-3*112 ta*0            Sig: Take 1 tablet by mouth daily Continuous therapy           for prevention of menses    Pharmacy sent refill request. Last OV was 8/21/19. Pt due for AE. TC to patient. LMTC.   Mily Hernandez, CONSTANTINO-BSN

## 2020-11-22 ENCOUNTER — HEALTH MAINTENANCE LETTER (OUTPATIENT)
Age: 45
End: 2020-11-22

## 2020-12-21 ENCOUNTER — TELEPHONE (OUTPATIENT)
Dept: OBGYN | Facility: CLINIC | Age: 45
End: 2020-12-21

## 2020-12-21 DIAGNOSIS — Z01.419 ENCOUNTER FOR GYNECOLOGICAL EXAMINATION WITHOUT ABNORMAL FINDING: ICD-10-CM

## 2020-12-21 RX ORDER — ETHYNODIOL DIACETATE AND ETHINYL ESTRADIOL 1 MG-35MCG
1 KIT ORAL DAILY
Qty: 112 TABLET | Refills: 0 | Status: SHIPPED | OUTPATIENT
Start: 2020-12-21 | End: 2021-01-26

## 2020-12-21 NOTE — TELEPHONE ENCOUNTER
Reason for Call:  Medication or medication refill:    Do you use a [facility]?  Name of the pharmacy and phone number for the current request:  [facility] [place]    Name of the medication requested: Lew    Other request: She will be out of medication on Friday    Can we leave a detailed message on this number? YES    Phone number patient can be reached at: Home number on file [phone] (home)    Best Time: any     Call taken on 12/21/2020 at 2:59 PM by Jayde Meade

## 2021-01-26 ENCOUNTER — OFFICE VISIT (OUTPATIENT)
Dept: OBGYN | Facility: CLINIC | Age: 46
End: 2021-01-26
Payer: COMMERCIAL

## 2021-01-26 VITALS
WEIGHT: 129 LBS | SYSTOLIC BLOOD PRESSURE: 100 MMHG | DIASTOLIC BLOOD PRESSURE: 78 MMHG | HEIGHT: 65 IN | BODY MASS INDEX: 21.49 KG/M2

## 2021-01-26 DIAGNOSIS — Z01.419 ENCOUNTER FOR GYNECOLOGICAL EXAMINATION WITHOUT ABNORMAL FINDING: Primary | ICD-10-CM

## 2021-01-26 DIAGNOSIS — Z83.719 FAMILY HISTORY OF COLONIC POLYPS: ICD-10-CM

## 2021-01-26 DIAGNOSIS — Z13.220 SCREENING FOR LIPID DISORDERS: ICD-10-CM

## 2021-01-26 DIAGNOSIS — Z13.1 SCREENING FOR DIABETES MELLITUS: ICD-10-CM

## 2021-01-26 LAB
ALBUMIN SERPL-MCNC: 3.8 G/DL (ref 3.4–5)
ALP SERPL-CCNC: 53 U/L (ref 40–150)
ALT SERPL W P-5'-P-CCNC: 23 U/L (ref 0–50)
ANION GAP SERPL CALCULATED.3IONS-SCNC: 6 MMOL/L (ref 3–14)
AST SERPL W P-5'-P-CCNC: 17 U/L (ref 0–45)
BILIRUB SERPL-MCNC: 0.4 MG/DL (ref 0.2–1.3)
BUN SERPL-MCNC: 12 MG/DL (ref 7–30)
CALCIUM SERPL-MCNC: 9.3 MG/DL (ref 8.5–10.1)
CHLORIDE SERPL-SCNC: 102 MMOL/L (ref 94–109)
CHOLEST SERPL-MCNC: 231 MG/DL
CO2 SERPL-SCNC: 27 MMOL/L (ref 20–32)
CREAT SERPL-MCNC: 0.87 MG/DL (ref 0.52–1.04)
ERYTHROCYTE [DISTWIDTH] IN BLOOD BY AUTOMATED COUNT: 13.1 % (ref 10–15)
GFR SERPL CREATININE-BSD FRML MDRD: 82 ML/MIN/{1.73_M2}
GLUCOSE SERPL-MCNC: 84 MG/DL (ref 70–99)
HCT VFR BLD AUTO: 44.6 % (ref 35–47)
HDLC SERPL-MCNC: 99 MG/DL
HGB BLD-MCNC: 14.3 G/DL (ref 11.7–15.7)
LDLC SERPL CALC-MCNC: 88 MG/DL
MCH RBC QN AUTO: 30.7 PG (ref 26.5–33)
MCHC RBC AUTO-ENTMCNC: 32.1 G/DL (ref 31.5–36.5)
MCV RBC AUTO: 96 FL (ref 78–100)
NONHDLC SERPL-MCNC: 132 MG/DL
PLATELET # BLD AUTO: 267 10E9/L (ref 150–450)
POTASSIUM SERPL-SCNC: 3.5 MMOL/L (ref 3.4–5.3)
PROT SERPL-MCNC: 7.5 G/DL (ref 6.8–8.8)
RBC # BLD AUTO: 4.66 10E12/L (ref 3.8–5.2)
SODIUM SERPL-SCNC: 135 MMOL/L (ref 133–144)
TRIGL SERPL-MCNC: 221 MG/DL
WBC # BLD AUTO: 14.1 10E9/L (ref 4–11)

## 2021-01-26 PROCEDURE — 80061 LIPID PANEL: CPT | Performed by: OBSTETRICS & GYNECOLOGY

## 2021-01-26 PROCEDURE — 85027 COMPLETE CBC AUTOMATED: CPT | Performed by: OBSTETRICS & GYNECOLOGY

## 2021-01-26 PROCEDURE — 99396 PREV VISIT EST AGE 40-64: CPT | Performed by: OBSTETRICS & GYNECOLOGY

## 2021-01-26 PROCEDURE — 80053 COMPREHEN METABOLIC PANEL: CPT | Performed by: OBSTETRICS & GYNECOLOGY

## 2021-01-26 PROCEDURE — 36415 COLL VENOUS BLD VENIPUNCTURE: CPT | Performed by: OBSTETRICS & GYNECOLOGY

## 2021-01-26 RX ORDER — ETHYNODIOL DIACETATE AND ETHINYL ESTRADIOL 1 MG-35MCG
1 KIT ORAL DAILY
Qty: 112 TABLET | Refills: 4 | Status: SHIPPED | OUTPATIENT
Start: 2021-01-26 | End: 2022-02-05

## 2021-01-26 ASSESSMENT — MIFFLIN-ST. JEOR: SCORE: 1233.08

## 2021-01-26 NOTE — PROGRESS NOTES
Ashia is a 45 year old  female who presents for annual exam.     Menses are regular q 28-30 days and irregular lasting 5 days.  Menses flow: light and sometime heavy.  Patient's last menstrual period was 2021.. Using oral contraceptives for contraception.  She is not currently considering pregnancy.  Besides routine health maintenance, she has no other health concerns today .  Kids are now 12 and 10 y/o.  youngest missed school and gets to go back soon.   On this OCP has had some weird months with spotting/bleeding during active tablet weeks. This last month was just fine but...  Going to see endocrine this afternoon. Weight is up 10 lbs from last year and she is very aware.  GYNECOLOGIC HISTORY:  Menarche:   Ashia is sexually active with 1male partner(s) and is currently in monogamous relationship.    History sexually transmitted infections:No STD history  STI testing offered?  Declined  [place] exposure: Unknown  History of abnormal Pap smear: NO - age 30-65 PAP every 5 years with negative HPV co-testing recommended  Family history of breast CA: Yes (Please explain): mgm  Family history of uterine/ovarian CA: No    Family history of colon CA: colon polyps-mom and sis    HEALTH MAINTENANCE:  Cholesterol: (  Cholesterol   Date Value Ref Range Status   2019 207 (H) <200 mg/dL Final     Comment:     Desirable:       <200 mg/dl   2018 171 <200 mg/dL Final    History of abnormal lipids: Yes  Mammo: due . History of abnormal Mammo: Not applicable.  Regular Self Breast Exams: Yes  Calcium/Vitamin D intake: source:  dairy, dietary supplement(s) Adequate? Yes  TSH: (  TSH   Date Value Ref Range Status   2019 2.52 0.40 - 4.00 mU/L Final    )  Pap; (  Lab Results   Component Value Date    PAP NIL 2017    PAP NIL 2013    PAP NIL 2010    )    HISTORY:  OB History    Para Term  AB Living   3 2 2 0 1 2   SAB TAB Ectopic Multiple Live Births   1 0 0 0 2      # Outcome  "Date GA Lbr Walt/2nd Weight Sex Delivery Anes PTL Lv   3 Term 10/01/10 39w4d  2.835 kg (6 lb 4 oz) F   N DELBERT      Birth Comments: IOL for IUGR, had prolapsed arm      Name: Ibeth      Apgar1: 9  Apgar5: 9   2 Term 08 38w5d  2.466 kg (5 lb 7 oz) M  EPI N DELBERT      Birth Comments: induced for IUGR      Name: Darek      Apgar1: 8  Apgar5: 9   1 SAB              Past Medical History:   Diagnosis Date     Allergic rhinitis, cause unspecified      Chronic lymphocytic thyroiditis      Corticoadrenal insufficiency     Dr. Freddie AGUILAR of [place] [facility]     Extrinsic asthma, unspecified      Other chronic allergic conjunctivitis     Allergic conjunctivitis     Other forms of retinal detachment(361.89)      Unspecified hypothyroidism      Past Surgical History:   Procedure Laterality Date     addisonian crisis       C REPAIR DETACH RETINA,SCLERAL BUCKLE       HC TOOTH EXTRACTION W/FORCEP       Family History   Problem Relation Age of Onset     Hypertension Mother      Arthritis Mother      Osteopenia Mother      Colon Polyps Mother      Cancer Father         basal cell cancer     Cerebrovascular Disease Father         CVA     Hypertension Father      Other - See Comments Father         stroke     Colon Polyps Sister 42     Depression Sister      Cardiovascular Maternal Grandmother         \"heart troubles\"     Breast Cancer Maternal Grandmother      Osteoporosis Maternal Grandmother      Asthma Paternal Grandmother      Cerebrovascular Disease Paternal Grandmother      Prostate Cancer Paternal Grandfather      Asthma Sister      Asthma Sister      Diabetes No family hx of      Social History     Socioeconomic History     Marital status:      Spouse name: Emmett     Number of children: 2     Years of education: Not on file     Highest education level: Not on file   Occupational History     Employer: [employer]   Social Needs     Financial resource strain: Not on file     Food insecurity     Worry: Not on " file     Inability: Not on file     Transportation needs     Medical: Not on file     Non-medical: Not on file   Tobacco Use     Smoking status: Never Smoker     Smokeless tobacco: Never Used   Substance and Sexual Activity     Alcohol use: Yes     Frequency: 2-4 times a month     Drinks per session: 1 or 2     Binge frequency: Never     Comment: 1-2 per week     Drug use: No     Sexual activity: Yes     Partners: Male     Birth control/protection: OCP, Pill   Lifestyle     Physical activity     Days per week: Not on file     Minutes per session: Not on file     Stress: Not on file   Relationships     Social connections     Talks on phone: Not on file     Gets together: Not on file     Attends Sikh service: Not on file     Active member of club or organization: Not on file     Attends meetings of clubs or organizations: Not on file     Relationship status: Not on file     Intimate partner violence     Fear of current or ex partner: Not on file     Emotionally abused: Not on file     Physically abused: Not on file     Forced sexual activity: Not on file   Other Topics Concern     Not on file   Social History Narrative     Not on file       Current Outpatient Medications:      doxylamine (UNISOM) 25 MG TABS tablet, Take 25 mg by mouth At Bedtime, Disp: , Rfl:      Cholecalciferol (VITAMIN D) 2000 UNIT tablet, Take 1 tablet by mouth daily., Disp: , Rfl:      ethynodiol-ethinyl estradiol (KELNOR) 1-35 MG-MCG tablet, Take 1 tablet by mouth daily Continuous therapy for prevention of menses, Disp: 112 tablet, Rfl: 0     FLUDROCORTISONE ACETATE 0.1 MG OR TABS, 1 TABLET DAILY, Disp: 30, Rfl: 0     hydrocortisone (CORTEF) 10 MG tablet, Take 10 mg by mouth daily. 10mg AM 10mg Lunch 5mg at Dinner, Disp: , Rfl:      levothyroxine (SYNTHROID/LEVOTHROID) 112 MCG tablet, Take 1 tablet (112 mcg) by mouth daily, Disp: , Rfl:      WOMENS DAILY MULTIVITAMIN OR TABS, 1 TABLET DAILY, Disp: 30, Rfl: 0     Allergies   Allergen  "Reactions     Penicillins      Sulfa Drugs        Past medical, surgical, social and family history were reviewed and updated in EPIC.    EXAM:  /78   Ht 1.638 m (5' 4.5\")   Wt 58.5 kg (129 lb)   LMP 01/19/2021   BMI 21.80 kg/m     BMI: Body mass index is 21.8 kg/m .  Constitutional: healthy, alert and no distress  Head: Normocephalic. No masses, lesions, tenderness or abnormalities  Neck: Neck supple. Trachea midline. No adenopathy. Thyroid symmetric, normal size.   Cardiovascular: RRR.   Respiratory: Negative.   Breast: No nodularity, asymmetry or nipple discharge bilaterally.  Gastrointestinal: Abdomen soft, non-tender, non-distended. No masses, organomegaly.  :  Vulva:  No external lesions, normal female hair distribution, no inguinal adenopathy.    Urethra:  Midline, non-tender, well supported, no discharge  Vagina:  Moist, pink, no abnormal discharge, no lesions  Uterus:  Normal size , non-tender, freely mobile  Ovaries:  No masses appreciated, non-tender, mobile  Rectal Exam: deferred  Musculoskeletal: extremities normal  Skin: no suspicious lesions or rashes  Psychiatric: Affect appropriate, cooperative,mentation appears normal.     COUNSELING:   Reviewed preventive health counseling, as reflected in patient instructions   reports that she has never smoked. She has never used smokeless tobacco.    Body mass index is 21.8 kg/m .    FRAX Risk Assessment    ASSESSMENT:  45 year old female with satisfactory annual exam  (Z01.419) Encounter for gynecological examination without abnormal finding  (primary encounter diagnosis)  Comment: OCP  Plan: CBC with platelets, ethynodiol-ethinyl         estradiol (KELNOR) 1-35 MG-MCG tablet        Refill of OCP done and discussed if still having BTB would change to another pill. She can just email if needed.    (Z13.220) Screening for lipid disorders  Comment: fasting   Plan: Lipid Profile        Check lab today    (Z13.1) Screening for diabetes " mellitus  Comment: fasting  Plan: Comprehensive metabolic panel        Check lab today    (Z83.71) Family history of colonic polyps  Comment: mother, sister  Plan: GASTROENTEROLOGY ADULT REF PROCEDURE ONLY        Needs to have early colonoscopy and this was ordered again today.      Eveline Lopez MD

## 2021-01-27 ENCOUNTER — TELEPHONE (OUTPATIENT)
Dept: GASTROENTEROLOGY | Facility: CLINIC | Age: 46
End: 2021-01-27

## 2021-01-27 NOTE — TELEPHONE ENCOUNTER
Left message for patient to call back to schedule colonoscopy.     Procedure: Lower Endoscopy    Lower Endoscopy Type: Colonoscopy    Purpose of Colonoscopy Procedure: History of Polyps    Colonoscopy reason for referral: strong family history of polyps; mom and sister    Colonoscopy Sedation: Conscious/Moderate    Preferred Location[place] [facility] [place]    Scheduling Instructions: If you have not heard from the scheduling office within 2 business days, please call [phone].      Dx: Family history of colonic polyps [Z83.71]

## 2021-01-29 ENCOUNTER — TELEPHONE (OUTPATIENT)
Dept: GASTROENTEROLOGY | Facility: CLINIC | Age: 46
End: 2021-01-29

## 2021-01-29 ENCOUNTER — TELEPHONE (OUTPATIENT)
Dept: GASTROENTEROLOGY | Facility: OUTPATIENT CENTER | Age: 46
End: 2021-01-29

## 2021-01-29 NOTE — TELEPHONE ENCOUNTER
Patient is scheduled for COLON with Dr. FRANCIS    Spoke with: PATIENT    Date of Procedure: 3/8/2021    Location: [facility]    Sedation Type CS    Pre-op for Unit J MAC N/A    (if yes advise patient they will need a pre-op prior to procedure)      Is patient on blood thinners? -N (If yes- inform patient to follow up with PCP or provider for follow up instructions)     Informed patient they will need an adult  Y    Informed Patient of COVID Test Requirement Y-SCHED    Preferred Pharmacy for Pre Prescription N/A    Confirmed Nurse will call to complete assessment Y    Additional comments: N/A

## 2021-01-29 NOTE — TELEPHONE ENCOUNTER
2nd Attempt     Left message for patient to call back to schedule colonoscopy.      Procedure: Lower Endoscopy     Lower Endoscopy Type: Colonoscopy     Purpose of Colonoscopy Procedure: History of Polyps     Colonoscopy reason for referral: strong family history of polyps; mom and sister     Colonoscopy Sedation: Conscious/Moderate     Preferred Location: [facility] [place]     Scheduling Instructions: If you have not heard from the scheduling office within 2 business days, please call [phone].        Dx: Family history of colonic polyps [Z83.71

## 2021-02-20 DIAGNOSIS — Z11.59 ENCOUNTER FOR SCREENING FOR OTHER VIRAL DISEASES: Primary | ICD-10-CM

## 2021-03-04 ENCOUNTER — TELEPHONE (OUTPATIENT)
Dept: GASTROENTEROLOGY | Facility: CLINIC | Age: 46
End: 2021-03-04

## 2021-03-05 DIAGNOSIS — Z11.59 ENCOUNTER FOR SCREENING FOR OTHER VIRAL DISEASES: ICD-10-CM

## 2021-03-05 LAB
LABORATORY COMMENT REPORT: NORMAL
SARS-COV-2 RNA RESP QL NAA+PROBE: NEGATIVE
SARS-COV-2 RNA RESP QL NAA+PROBE: NORMAL
SPECIMEN SOURCE: NORMAL
SPECIMEN SOURCE: NORMAL

## 2021-03-05 PROCEDURE — U0003 INFECTIOUS AGENT DETECTION BY NUCLEIC ACID (DNA OR RNA); SEVERE ACUTE RESPIRATORY SYNDROME CORONAVIRUS 2 (SARS-COV-2) (CORONAVIRUS DISEASE [COVID-19]), AMPLIFIED PROBE TECHNIQUE, MAKING USE OF HIGH THROUGHPUT TECHNOLOGIES AS DESCRIBED BY CMS-2020-01-R: HCPCS | Performed by: INTERNAL MEDICINE

## 2021-03-05 PROCEDURE — U0005 INFEC AGEN DETEC AMPLI PROBE: HCPCS | Performed by: INTERNAL MEDICINE

## 2021-03-08 ENCOUNTER — HOSPITAL ENCOUNTER (OUTPATIENT)
Facility: AMBULATORY SURGERY CENTER | Age: 46
Discharge: HOME | End: 2021-03-08
Attending: INTERNAL MEDICINE | Admitting: INTERNAL MEDICINE
Payer: COMMERCIAL

## 2021-03-08 VITALS
SYSTOLIC BLOOD PRESSURE: 127 MMHG | RESPIRATION RATE: 16 BRPM | DIASTOLIC BLOOD PRESSURE: 77 MMHG | TEMPERATURE: 97.8 F | OXYGEN SATURATION: 100 % | HEART RATE: 60 BPM

## 2021-03-08 LAB
COLONOSCOPY: NORMAL
HCG UR QL: NEGATIVE
INTERNAL QC OK POCT: YES

## 2021-03-08 PROCEDURE — 88305 TISSUE EXAM BY PATHOLOGIST: CPT | Performed by: PATHOLOGY

## 2021-03-08 PROCEDURE — 81025 URINE PREGNANCY TEST: CPT | Performed by: INTERNAL MEDICINE

## 2021-03-08 PROCEDURE — 45385 COLONOSCOPY W/LESION REMOVAL: CPT | Mod: 33

## 2021-03-08 RX ORDER — FENTANYL CITRATE 50 UG/ML
INJECTION, SOLUTION INTRAMUSCULAR; INTRAVENOUS PRN
Status: DISCONTINUED | OUTPATIENT
Start: 2021-03-08 | End: 2021-03-08 | Stop reason: HOSPADM

## 2021-03-08 RX ORDER — ONDANSETRON 2 MG/ML
4 INJECTION INTRAMUSCULAR; INTRAVENOUS
Status: DISCONTINUED | OUTPATIENT
Start: 2021-03-08 | End: 2021-03-08 | Stop reason: HOSPADM

## 2021-03-08 RX ORDER — FLUMAZENIL 0.1 MG/ML
0.2 INJECTION, SOLUTION INTRAVENOUS
Status: DISCONTINUED | OUTPATIENT
Start: 2021-03-08 | End: 2021-03-09 | Stop reason: HOSPADM

## 2021-03-08 RX ORDER — NALOXONE HYDROCHLORIDE 0.4 MG/ML
0.4 INJECTION, SOLUTION INTRAMUSCULAR; INTRAVENOUS; SUBCUTANEOUS
Status: DISCONTINUED | OUTPATIENT
Start: 2021-03-08 | End: 2021-03-09 | Stop reason: HOSPADM

## 2021-03-08 RX ORDER — ONDANSETRON 4 MG/1
4 TABLET, ORALLY DISINTEGRATING ORAL EVERY 6 HOURS PRN
Status: DISCONTINUED | OUTPATIENT
Start: 2021-03-08 | End: 2021-03-09 | Stop reason: HOSPADM

## 2021-03-08 RX ORDER — PROCHLORPERAZINE MALEATE 10 MG
10 TABLET ORAL EVERY 6 HOURS PRN
Status: DISCONTINUED | OUTPATIENT
Start: 2021-03-08 | End: 2021-03-09 | Stop reason: HOSPADM

## 2021-03-08 RX ORDER — LIDOCAINE 40 MG/G
CREAM TOPICAL
Status: DISCONTINUED | OUTPATIENT
Start: 2021-03-08 | End: 2021-03-08 | Stop reason: HOSPADM

## 2021-03-08 RX ORDER — NALOXONE HYDROCHLORIDE 0.4 MG/ML
0.2 INJECTION, SOLUTION INTRAMUSCULAR; INTRAVENOUS; SUBCUTANEOUS
Status: DISCONTINUED | OUTPATIENT
Start: 2021-03-08 | End: 2021-03-09 | Stop reason: HOSPADM

## 2021-03-08 RX ORDER — SIMETHICONE
LIQUID (ML) MISCELLANEOUS PRN
Status: DISCONTINUED | OUTPATIENT
Start: 2021-03-08 | End: 2021-03-08 | Stop reason: HOSPADM

## 2021-03-08 RX ORDER — ONDANSETRON 2 MG/ML
4 INJECTION INTRAMUSCULAR; INTRAVENOUS EVERY 6 HOURS PRN
Status: DISCONTINUED | OUTPATIENT
Start: 2021-03-08 | End: 2021-03-09 | Stop reason: HOSPADM

## 2021-03-09 LAB — COPATH REPORT: NORMAL

## 2021-06-03 VITALS — WEIGHT: 122 LBS | BODY MASS INDEX: 20.62 KG/M2

## 2021-06-17 NOTE — PATIENT INSTRUCTIONS - HE
Patient Instructions by Can Marques PA-C at 5/27/2019 11:50 AM     Author: Can Marques PA-C Service: -- Author Type: Physician Assistant    Filed: 5/27/2019 12:33 PM Encounter Date: 5/27/2019 Status: Addendum    : Can Marques PA-C (Physician Assistant)    Related Notes: Original Note by Can Marques PA-C (Physician Assistant) filed at 5/27/2019 12:33 PM       You were seen today for acute bronchitis and left otitis media    Symptom management:  - Get plenty of rest  - Avoid smoking and second hand smoke  - May take tylenol or ibuprofen for fever/discomfort  - Drink plenty of non-caffeinated fluids  - Use nasal steroid spray for sinus congestion  - Albuterol inhaler may be used every 6 hours as needed for chest tightness      Reasons to be seen in the emergency room:  - Develop a fever of 100.4 or higher  - Cough changes, coughing up blood, or become short of breath  - Neck stiffness  - Chest pain  - Severe headache  - Unable to tolerate eating or drinking fluids    Otherwise, if no symptom improvement after 5 days, follow-up with your primary care provider.      Patient Education     Bronchitis, Antibiotic Treatment (Adult)    Bronchitis is an infection of the air passages (bronchial tubes) in your lungs. It often occurs when you have a cold. This illness is contagious during the first few days and is spread through the air by coughing and sneezing, or by direct contact (touching the sick person and then touching your own eyes, nose, or mouth).  Symptoms of bronchitis include cough with mucus (phlegm) and low-grade fever. Bronchitis usually lasts 7 to 14 days. Mild cases can be treated with simple home remedies. More severe infection is treated with an antibiotic.  Home care  Follow these guidelines when caring for yourself at home:    If your symptoms are severe, rest at home for the first 2 to 3 days. When you go back to your usual activities, don't let yourself get too tired.    Do not smoke. Also  avoid being exposed to secondhand smoke.    You may use over-the-counter medicines to control fever or pain, unless another medicine was prescribed. If you have chronic liver or kidney disease or have ever had a stomach ulcer or gastrointestinal bleeding, talk with your healthcare provider before using these medicines. Also talk to your provider if you are taking medicine to prevent blood clots. Aspirin should never be given to anyone younger than 18 years of age who is ill with a viral infection or fever. It may cause severe liver or brain damage.    Your appetite may be poor, so a light diet is fine. Avoid dehydration by drinking 6 to 8 glasses of fluids per day (such as water, soft drinks, sports drinks, juices, tea, or soup). Extra fluids will help loosen secretions in the nose and lungs.    Over-the-counter cough, cold, and sore-throat medicines will not shorten the length of the illness, but they may be helpful to reduce symptoms. (Note: Do not use decongestants if you have high blood pressure.)    Finish all antibiotic medicine. Do this even if you are feeling better after only a few days.  Follow-up care  Follow up with your healthcare provider, or as advised. If you had an X-ray or ECG (electrocardiogram), a specialist will review it. You will be notified of any new findings that may affect your care.  If you are age 65 or older, or if you have a chronic lung disease or condition that affects your immune system, or you smoke, ask your healthcare provider about getting a pneumococcal vaccine and a yearly flu shot (influenza vaccine).  When to seek medical advice  Call your healthcare provider right away if any of these occur:    Fever of 100.4 F (38 C) or higher, or as directed by your healthcare provider    Coughing up increased amounts of colored sputum    Weakness, drowsiness, headache, facial pain, ear pain, or a stiff neck  Call 911  Call 911 if any of these occur.    Coughing up blood    Worsening  weakness, drowsiness, headache, or stiff neck    Trouble breathing, wheezing, or pain with breathing  Date Last Reviewed: 9/13/2015 2000-2017 [employer]. [address] [place] [address], [place] [address]. All rights reserved. This information is not intended as a substitute for professional medical care. Always follow your healthcare professional's instructions.           Patient Education     Otitis Media (Middle-Ear Infection) in Adults  Otitis media is another name for a middle-ear infection. It means an infection behind your eardrum. This kind of ear infection can happen after any condition that keeps fluid from draining from the middle ear. These conditions include allergies, a cold, a sore throat, or a respiratory infection.  Middle-ear infections are common in children, but they can also happen in adults. An ear infection in an adult may mean a more serious problem than in a child. So you may need additional tests. If you have an ear infection, you should see your health care provider for treatment.  What are the types of middle-ear infections?  Infections can affect the middle ear in several ways. They are:    Acute otitis media. This middle-ear infection occurs suddenly. It causes swelling and redness. Fluid and mucus become trapped inside the ear. You can have a fever and ear pain.    Otitis media with effusion. Fluid (effusion) and mucus build up in the middle ear after the infection goes away. You may feel like your middle ear is full. This can continue for months and may affect your hearing.    Chronic otitis media with effusion. Fluid (effusion) remains in the middle ear for a long time. Or it builds up again and again, even though there is no infection. This type of middle-ear infection may be hard to treat. It may also affect your hearing.  Who is more likely to get a middle-ear infection?  You are more likely to get an ear infection if you:    Smoke or are around someone who smokes    Have  seasonal or year-round allergy symptoms    Have a cold or other upper respiratory infection  What causes a middle-ear infection?  The middle ear connects to the throat by a canal called the eustachian tube. This tube helps even out the pressure between the outer ear and the inner ear. A cold or allergy can irritate the tube or cause the area around it to swell. This can keep fluid from draining from the middle ear. The fluid builds up behind the eardrum. Bacteria and viruses can grow in this fluid. The bacteria and viruses cause the middle-ear infection.  What are the symptoms of a middle-ear infection?  Common symptoms of a middle-ear infection in adults are:    Pain in 1 or both ears    Drainage from the ear    Muffled hearing    Sore throat   You may also have a fever. Rarely, your balance can be affected.  These symptoms may be the same as for other conditions. Its important to talk with your health care provider if you think you have a middle-ear infection. If you have a high fever, severe pain behind your ear, or paralysis in your face, see your provider as soon as you can.  How is a middle-ear infection diagnosed?  Your health care provider will take a medical history and do a physical exam. He or she will look at the outer ear and eardrum with an otoscope. The otoscope is a lighted tool that lets your provider see inside the ear. A pneumatic otoscope blows a puff of air into the ear to check how well your eardrum moves. If you eardrum doesnt move well, it may mean you have fluid behind it.  Your provider may also do a test called tympanometry. This test tells how well the middle ear is working. It can find any changes in pressure in the middle ear. Your provider may test your hearing with a tuning fork.  How is a middle-ear infection treated?  A middle-ear infection may be treated with:    Antibiotics, taken by mouth or as ear drops    Medication for pain    Decongestants, antihistamines, or nasal  steroids  Your health care provider may also have you try autoinsufflation. This helps adjust the air pressure in your ear. For this, you pinch your nose and gently exhale. This forces air back through the eustachian tube.  The exact treatment for your ear infection will depend on the type of infection you have. In general, if your symptoms dont get better in 48 to 72 hours, contact your health care provider.  Middle-ear infections can cause long-term problems if not treated. They can lead to:    Infection in other parts of the head    Permanent hearing loss    Paralysis of a nerve in your face  If you have a middle-ear infection that doesnt get better, you may need to see an ear, nose, and throat specialist (otolaryngologist). You may need a CT scan or MRI to check for head and neck cancer.  Ear tubes  Sometimes fluid stays in the middle ear even after you take antibiotics and the infection goes away. In this case, your health care provider may suggest that a small tube be placed in your ear. The tube is put at the opening of the eardrum. The tube keeps fluid from building up and relieves pressure in the middle ear. It can also help you hear better. This surgery is called myringotomy. It is not often done in adults.  The tubes usually fall out on their own after 6 months to a year.    [id number] [employer]. [address] [place] [address], [place] [address]. All rights reserved. This information is not intended as a substitute for professional medical care. Always follow your healthcare professional's instructions.

## 2021-06-27 NOTE — PROGRESS NOTES
Progress Notes by Can Marques PA-C at 5/27/2019 11:50 AM     Author: Can Marques PA-C Service: -- Author Type: Physician Assistant    Filed: 5/30/2019  8:17 AM Encounter Date: 5/27/2019 Status: Signed    : Can Marques PA-C (Physician Assistant)       Subjective:      Patient ID: Ashia Ying is a 43 y.o. female.    Chief Complaint:    HPI  Ashia Ying is a 43 y.o. female who presents today complaining of low-grade subjective fever dry nonproductive cough left sided ear pain and nasal congestion has been worsening over the last 2 weeks.  States that she started out with cold cough and congestion and now is developed left sided ear pain.  She denies hearing or balance deficits or otorrhea at this time.  Has not tried treatment for this over-the-counter.      No past medical history on file.    No past surgical history on file.    No family history on file.    Social History     Tobacco Use   ? Smoking status: Never Smoker   ? Smokeless tobacco: Never Used   Substance Use Topics   ? Alcohol use: Not on file   ? Drug use: Not on file       Review of Systems  As above in HPI, otherwise balance of Review of Systems are negative.    Objective:     /78 (Patient Site: Right Arm, Patient Position: Sitting, Cuff Size: Adult Regular)   Pulse 75   Temp 98.9  F (37.2  C)   Resp 16   Wt 122 lb (55.3 kg)   LMP 05/16/2019 (Approximate)   SpO2 98%   Breastfeeding? No     Physical Exam  General: Patient is resting comfortably no acute distress is afebrile  HEENT: Head is normocephalic atraumatic   eyes are PERRL EOMI sclera anicteric   TMs on the left is erythematous   TM on the right is with fluid in the middle ear  Throat is without pharyngeal wall erythema and no exudate  No cervical lymphadenopathy present  LUNGS: slight wheezes in the mid lung fields at the peribronchial area to auscultation bilaterally  HEART: Regular rate and rhythm  Skin: Without rash non-diaphoretic      Assessment:      Procedures    The primary encounter diagnosis was Acute bronchitis, unspecified organism. A diagnosis of Other acute nonsuppurative otitis media of left ear, recurrence not specified was also pertinent to this visit.    Plan:     1. Acute bronchitis, unspecified organism  azithromycin (ZITHROMAX) 500 MG tablet    albuterol (PROAIR HFA;PROVENTIL HFA;VENTOLIN HFA) 90 mcg/actuation inhaler   2. Other acute nonsuppurative otitis media of left ear, recurrence not specified  azithromycin (ZITHROMAX) 500 MG tablet    albuterol (PROAIR HFA;PROVENTIL HFA;VENTOLIN HFA) 90 mcg/actuation inhaler         Patient Instructions   You were seen today for acute bronchitis and left otitis media    Symptom management:  - Get plenty of rest  - Avoid smoking and second hand smoke  - May take tylenol or ibuprofen for fever/discomfort  - Drink plenty of non-caffeinated fluids  - Use nasal steroid spray for sinus congestion  - Albuterol inhaler may be used every 6 hours as needed for chest tightness      Reasons to be seen in the emergency room:  - Develop a fever of 100.4 or higher  - Cough changes, coughing up blood, or become short of breath  - Neck stiffness  - Chest pain  - Severe headache  - Unable to tolerate eating or drinking fluids    Otherwise, if no symptom improvement after 5 days, follow-up with your primary care provider.      Patient Education     Bronchitis, Antibiotic Treatment (Adult)    Bronchitis is an infection of the air passages (bronchial tubes) in your lungs. It often occurs when you have a cold. This illness is contagious during the first few days and is spread through the air by coughing and sneezing, or by direct contact (touching the sick person and then touching your own eyes, nose, or mouth).  Symptoms of bronchitis include cough with mucus (phlegm) and low-grade fever. Bronchitis usually lasts 7 to 14 days. Mild cases can be treated with simple home remedies. More severe infection is treated with an  antibiotic.  Home care  Follow these guidelines when caring for yourself at home:    If your symptoms are severe, rest at home for the first 2 to 3 days. When you go back to your usual activities, don't let yourself get too tired.    Do not smoke. Also avoid being exposed to secondhand smoke.    You may use over-the-counter medicines to control fever or pain, unless another medicine was prescribed. If you have chronic liver or kidney disease or have ever had a stomach ulcer or gastrointestinal bleeding, talk with your healthcare provider before using these medicines. Also talk to your provider if you are taking medicine to prevent blood clots. Aspirin should never be given to anyone younger than 18 years of age who is ill with a viral infection or fever. It may cause severe liver or brain damage.    Your appetite may be poor, so a light diet is fine. Avoid dehydration by drinking 6 to 8 glasses of fluids per day (such as water, soft drinks, sports drinks, juices, tea, or soup). Extra fluids will help loosen secretions in the nose and lungs.    Over-the-counter cough, cold, and sore-throat medicines will not shorten the length of the illness, but they may be helpful to reduce symptoms. (Note: Do not use decongestants if you have high blood pressure.)    Finish all antibiotic medicine. Do this even if you are feeling better after only a few days.  Follow-up care  Follow up with your healthcare provider, or as advised. If you had an X-ray or ECG (electrocardiogram), a specialist will review it. You will be notified of any new findings that may affect your care.  If you are age 65 or older, or if you have a chronic lung disease or condition that affects your immune system, or you smoke, ask your healthcare provider about getting a pneumococcal vaccine and a yearly flu shot (influenza vaccine).  When to seek medical advice  Call your healthcare provider right away if any of these occur:    Fever of 100.4 F (38 C) or  higher, or as directed by your healthcare provider    Coughing up increased amounts of colored sputum    Weakness, drowsiness, headache, facial pain, ear pain, or a stiff neck  Call 911  Call 911 if any of these occur.    Coughing up blood    Worsening weakness, drowsiness, headache, or stiff neck    Trouble breathing, wheezing, or pain with breathing  Date Last Reviewed: 9/13/2015 2000-2017 [employer]. [address] [place] [address], [place] [address]. All rights reserved. This information is not intended as a substitute for professional medical care. Always follow your healthcare professional's instructions.           Patient Education     Otitis Media (Middle-Ear Infection) in Adults  Otitis media is another name for a middle-ear infection. It means an infection behind your eardrum. This kind of ear infection can happen after any condition that keeps fluid from draining from the middle ear. These conditions include allergies, a cold, a sore throat, or a respiratory infection.  Middle-ear infections are common in children, but they can also happen in adults. An ear infection in an adult may mean a more serious problem than in a child. So you may need additional tests. If you have an ear infection, you should see your health care provider for treatment.  What are the types of middle-ear infections?  Infections can affect the middle ear in several ways. They are:    Acute otitis media. This middle-ear infection occurs suddenly. It causes swelling and redness. Fluid and mucus become trapped inside the ear. You can have a fever and ear pain.    Otitis media with effusion. Fluid (effusion) and mucus build up in the middle ear after the infection goes away. You may feel like your middle ear is full. This can continue for months and may affect your hearing.    Chronic otitis media with effusion. Fluid (effusion) remains in the middle ear for a long time. Or it builds up again and again, even though there is no  infection. This type of middle-ear infection may be hard to treat. It may also affect your hearing.  Who is more likely to get a middle-ear infection?  You are more likely to get an ear infection if you:    Smoke or are around someone who smokes    Have seasonal or year-round allergy symptoms    Have a cold or other upper respiratory infection  What causes a middle-ear infection?  The middle ear connects to the throat by a canal called the eustachian tube. This tube helps even out the pressure between the outer ear and the inner ear. A cold or allergy can irritate the tube or cause the area around it to swell. This can keep fluid from draining from the middle ear. The fluid builds up behind the eardrum. Bacteria and viruses can grow in this fluid. The bacteria and viruses cause the middle-ear infection.  What are the symptoms of a middle-ear infection?  Common symptoms of a middle-ear infection in adults are:    Pain in 1 or both ears    Drainage from the ear    Muffled hearing    Sore throat   You may also have a fever. Rarely, your balance can be affected.  These symptoms may be the same as for other conditions. Its important to talk with your health care provider if you think you have a middle-ear infection. If you have a high fever, severe pain behind your ear, or paralysis in your face, see your provider as soon as you can.  How is a middle-ear infection diagnosed?  Your health care provider will take a medical history and do a physical exam. He or she will look at the outer ear and eardrum with an otoscope. The otoscope is a lighted tool that lets your provider see inside the ear. A pneumatic otoscope blows a puff of air into the ear to check how well your eardrum moves. If you eardrum doesnt move well, it may mean you have fluid behind it.  Your provider may also do a test called tympanometry. This test tells how well the middle ear is working. It can find any changes in pressure in the middle ear. Your  provider may test your hearing with a tuning fork.  How is a middle-ear infection treated?  A middle-ear infection may be treated with:    Antibiotics, taken by mouth or as ear drops    Medication for pain    Decongestants, antihistamines, or nasal steroids  Your health care provider may also have you try autoinsufflation. This helps adjust the air pressure in your ear. For this, you pinch your nose and gently exhale. This forces air back through the eustachian tube.  The exact treatment for your ear infection will depend on the type of infection you have. In general, if your symptoms dont get better in 48 to 72 hours, contact your health care provider.  Middle-ear infections can cause long-term problems if not treated. They can lead to:    Infection in other parts of the head    Permanent hearing loss    Paralysis of a nerve in your face  If you have a middle-ear infection that doesnt get better, you may need to see an ear, nose, and throat specialist (otolaryngologist). You may need a CT scan or MRI to check for head and neck cancer.  Ear tubes  Sometimes fluid stays in the middle ear even after you take antibiotics and the infection goes away. In this case, your health care provider may suggest that a small tube be placed in your ear. The tube is put at the opening of the eardrum. The tube keeps fluid from building up and relieves pressure in the middle ear. It can also help you hear better. This surgery is called myringotomy. It is not often done in adults.  The tubes usually fall out on their own after 6 months to a year.    7577-9890 [employer]. [address] [place] [address], [place] [address]. All rights reserved. This information is not intended as a substitute for professional medical care. Always follow your healthcare professional's instructions.

## 2021-07-09 ENCOUNTER — HOSPITAL ENCOUNTER (OUTPATIENT)
Dept: MAMMOGRAPHY | Facility: CLINIC | Age: 46
Discharge: HOME | End: 2021-07-09
Attending: OBSTETRICS & GYNECOLOGY
Payer: COMMERCIAL

## 2021-07-09 DIAGNOSIS — Z12.31 VISIT FOR SCREENING MAMMOGRAM: ICD-10-CM

## 2021-09-19 ENCOUNTER — HEALTH MAINTENANCE LETTER (OUTPATIENT)
Age: 46
End: 2021-09-19

## 2022-02-04 ENCOUNTER — OFFICE VISIT (OUTPATIENT)
Dept: OBGYN | Facility: CLINIC | Age: 47
End: 2022-02-04
Payer: COMMERCIAL

## 2022-02-04 VITALS
OXYGEN SATURATION: 100 % | SYSTOLIC BLOOD PRESSURE: 138 MMHG | DIASTOLIC BLOOD PRESSURE: 78 MMHG | HEIGHT: 65 IN | BODY MASS INDEX: 21.36 KG/M2 | HEART RATE: 74 BPM | WEIGHT: 128.2 LBS

## 2022-02-04 DIAGNOSIS — I78.1 NEVUS, NON-NEOPLASTIC: ICD-10-CM

## 2022-02-04 DIAGNOSIS — Z11.59 NEED FOR HEPATITIS C SCREENING TEST: ICD-10-CM

## 2022-02-04 DIAGNOSIS — Z13.0 SCREENING, ANEMIA, DEFICIENCY, IRON: ICD-10-CM

## 2022-02-04 DIAGNOSIS — E03.9 HYPOTHYROIDISM, UNSPECIFIED TYPE: ICD-10-CM

## 2022-02-04 DIAGNOSIS — Z01.419 ENCOUNTER FOR GYNECOLOGICAL EXAMINATION WITHOUT ABNORMAL FINDING: Primary | ICD-10-CM

## 2022-02-04 DIAGNOSIS — Z12.4 PAP SMEAR FOR CERVICAL CANCER SCREENING: ICD-10-CM

## 2022-02-04 DIAGNOSIS — Z13.21 ENCOUNTER FOR VITAMIN DEFICIENCY SCREENING: ICD-10-CM

## 2022-02-04 DIAGNOSIS — Z13.1 SCREENING FOR DIABETES MELLITUS: ICD-10-CM

## 2022-02-04 DIAGNOSIS — Z13.220 SCREENING FOR LIPID DISORDERS: ICD-10-CM

## 2022-02-04 LAB
ALBUMIN SERPL-MCNC: 3.7 G/DL (ref 3.4–5)
ALP SERPL-CCNC: 54 U/L (ref 40–150)
ALT SERPL W P-5'-P-CCNC: 30 U/L (ref 0–50)
ANION GAP SERPL CALCULATED.3IONS-SCNC: 9 MMOL/L (ref 3–14)
AST SERPL W P-5'-P-CCNC: 17 U/L (ref 0–45)
BILIRUB SERPL-MCNC: 0.4 MG/DL (ref 0.2–1.3)
BUN SERPL-MCNC: 13 MG/DL (ref 7–30)
CALCIUM SERPL-MCNC: 9.1 MG/DL (ref 8.5–10.1)
CHLORIDE BLD-SCNC: 101 MMOL/L (ref 94–109)
CHOLEST SERPL-MCNC: 216 MG/DL
CO2 SERPL-SCNC: 26 MMOL/L (ref 20–32)
CREAT SERPL-MCNC: 0.83 MG/DL (ref 0.52–1.04)
DEPRECATED CALCIDIOL+CALCIFEROL SERPL-MC: 60 UG/L (ref 20–75)
ERYTHROCYTE [DISTWIDTH] IN BLOOD BY AUTOMATED COUNT: 14.3 % (ref 10–15)
FASTING STATUS PATIENT QL REPORTED: YES
GFR SERPL CREATININE-BSD FRML MDRD: 89 ML/MIN/1.73M2
GLUCOSE BLD-MCNC: 74 MG/DL (ref 70–99)
HBA1C MFR BLD: 5.1 % (ref 0–5.6)
HCT VFR BLD AUTO: 44.5 % (ref 35–47)
HCV AB SERPL QL IA: NONREACTIVE
HDLC SERPL-MCNC: 89 MG/DL
HGB BLD-MCNC: 14.4 G/DL (ref 11.7–15.7)
LDLC SERPL CALC-MCNC: 89 MG/DL
MCH RBC QN AUTO: 30.6 PG (ref 26.5–33)
MCHC RBC AUTO-ENTMCNC: 32.4 G/DL (ref 31.5–36.5)
MCV RBC AUTO: 95 FL (ref 78–100)
NONHDLC SERPL-MCNC: 127 MG/DL
PLATELET # BLD AUTO: 312 10E3/UL (ref 150–450)
POTASSIUM BLD-SCNC: 3.1 MMOL/L (ref 3.4–5.3)
PROT SERPL-MCNC: 7.6 G/DL (ref 6.8–8.8)
RBC # BLD AUTO: 4.71 10E6/UL (ref 3.8–5.2)
SODIUM SERPL-SCNC: 136 MMOL/L (ref 133–144)
T4 FREE SERPL-MCNC: 1.48 NG/DL (ref 0.76–1.46)
TRIGL SERPL-MCNC: 191 MG/DL
TSH SERPL DL<=0.005 MIU/L-ACNC: 1.48 MU/L (ref 0.4–4)
WBC # BLD AUTO: 14.2 10E3/UL (ref 4–11)

## 2022-02-04 PROCEDURE — 80053 COMPREHEN METABOLIC PANEL: CPT | Performed by: OBSTETRICS & GYNECOLOGY

## 2022-02-04 PROCEDURE — 82306 VITAMIN D 25 HYDROXY: CPT | Performed by: OBSTETRICS & GYNECOLOGY

## 2022-02-04 PROCEDURE — 84439 ASSAY OF FREE THYROXINE: CPT | Performed by: OBSTETRICS & GYNECOLOGY

## 2022-02-04 PROCEDURE — 84443 ASSAY THYROID STIM HORMONE: CPT | Performed by: OBSTETRICS & GYNECOLOGY

## 2022-02-04 PROCEDURE — 80061 LIPID PANEL: CPT | Performed by: OBSTETRICS & GYNECOLOGY

## 2022-02-04 PROCEDURE — 87624 HPV HI-RISK TYP POOLED RSLT: CPT | Performed by: OBSTETRICS & GYNECOLOGY

## 2022-02-04 PROCEDURE — 85027 COMPLETE CBC AUTOMATED: CPT | Performed by: OBSTETRICS & GYNECOLOGY

## 2022-02-04 PROCEDURE — G0145 SCR C/V CYTO,THINLAYER,RESCR: HCPCS | Performed by: OBSTETRICS & GYNECOLOGY

## 2022-02-04 PROCEDURE — 36415 COLL VENOUS BLD VENIPUNCTURE: CPT | Performed by: OBSTETRICS & GYNECOLOGY

## 2022-02-04 PROCEDURE — 86803 HEPATITIS C AB TEST: CPT | Performed by: OBSTETRICS & GYNECOLOGY

## 2022-02-04 PROCEDURE — 83036 HEMOGLOBIN GLYCOSYLATED A1C: CPT | Performed by: OBSTETRICS & GYNECOLOGY

## 2022-02-04 PROCEDURE — 99396 PREV VISIT EST AGE 40-64: CPT | Performed by: OBSTETRICS & GYNECOLOGY

## 2022-02-04 RX ORDER — NORGESTIMATE AND ETHINYL ESTRADIOL 0.25-0.035
1 KIT ORAL DAILY
Qty: 112 TABLET | Refills: 3 | Status: SHIPPED | OUTPATIENT
Start: 2022-02-04 | End: 2023-01-03

## 2022-02-04 ASSESSMENT — MIFFLIN-ST. JEOR: SCORE: 1224.45

## 2022-02-04 NOTE — PROGRESS NOTES
Ashia is a 46 year old  female who presents for annual exam.     Menses are regular q 60 days and normal lasting 7 days.  Menses flow: normal.  Patient's last menstrual period was 2021.. Using oral contraceptives for contraception.  She is not currently considering pregnancy.  Besides routine health maintenance, she has no other health concerns today . Kids are 13 and 10 y/o.  Fasting today for labs.  Has had some irregular bleeding on this OCP and cannot seem to get under control we have a tried a few of them so wondering if should just go off for a bit?  GYNECOLOGIC HISTORY:  Menarche:   Ashia is sexually active with 1male partner(s) and is currently in monogamous relationship.    History sexually transmitted infections:No STD history  STI testing offered?  Declined  [place] exposure: Unknown  History of abnormal Pap smear: NO - age 30-65 PAP every 5 years with negative HPV co-testing recommended  Family history of breast CA: Yes (Please explain): mgm  Family history of uterine/ovarian CA: No    Family history of colon CA: No    HEALTH MAINTENANCE:  Cholesterol: (  Cholesterol   Date Value Ref Range Status   2021 231 (H) <200 mg/dL Final     Comment:     Desirable:       <200 mg/dl   2019 207 (H) <200 mg/dL Final     Comment:     Desirable:       <200 mg/dl    History of abnormal lipids: Yes  Mammo: 21 . History of abnormal Mammo: No.  Regular Self Breast Exams: Yes  Calcium/Vitamin D intake: source:  dairy, dietary supplement(s) Adequate? Yes  TSH: (  TSH   Date Value Ref Range Status   2019 2.52 0.40 - 4.00 mU/L Final    )  Pap; (  Lab Results   Component Value Date    PAP NIL 2017    PAP NIL 2013    PAP NIL 2010    )    HISTORY:  OB History    Para Term  AB Living   3 2 2 0 1 2   SAB IAB Ectopic Multiple Live Births   1 0 0 0 2      # Outcome Date GA Lbr Walt/2nd Weight Sex Delivery Anes PTL Lv   3 Term 10/01/10 39w4d  2.835 kg (6 lb 4 oz) F   " N DELBERT      Birth Comments: IOL for IUGR, had prolapsed arm      Name: Ibeth      Apgar1: 9  Apgar5: 9   2 Term 08 38w5d  2.466 kg (5 lb 7 oz) M  EPI N DELBERT      Birth Comments: induced for IUGR      Name: Darek      Apgar1: 8  Apgar5: 9   1 SAB              Past Medical History:   Diagnosis Date     Allergic rhinitis, cause unspecified      Chronic lymphocytic thyroiditis      Corticoadrenal insufficiency     Dr. Ferddie AGUILAR of [facility]     Extrinsic asthma, unspecified      Other chronic allergic conjunctivitis     Allergic conjunctivitis     Other forms of retinal detachment(361.89)      Unspecified hypothyroidism      Past Surgical History:   Procedure Laterality Date     addisonian crisis       COLONOSCOPY N/A 3/8/2021    Procedure: COLONOSCOPY, WITH POLYPECTOMY;  Surgeon: Natalia Bangura MD;  Location: [facility] OR      TOOTH EXTRACTION W/FORCEP       ZZC REPAIR DETACH RETINA,SCLERAL BUCKLE       Family History   Problem Relation Age of Onset     Hypertension Mother      Arthritis Mother      Osteopenia Mother      Colon Polyps Mother      Cancer Father         basal cell cancer     Cerebrovascular Disease Father         CVA     Hypertension Father      Other - See Comments Father         stroke     Colon Polyps Sister 42     Depression Sister      Cardiovascular Maternal Grandmother         \"heart troubles\"     Breast Cancer Maternal Grandmother      Osteoporosis Maternal Grandmother      Asthma Paternal Grandmother      Cerebrovascular Disease Paternal Grandmother      Prostate Cancer Paternal Grandfather      Asthma Sister      Asthma Sister      Diabetes No family hx of      Social History     Socioeconomic History     Marital status:      Spouse name: Emmett     Number of children: 2     Years of education: Not on file     Highest education level: Not on file   Occupational History     Employer: [employer]   Tobacco Use     Smoking status: Never Smoker     Smokeless tobacco: Never " "Used   Substance and Sexual Activity     Alcohol use: Yes     Comment: 1-2 per week     Drug use: No     Sexual activity: Yes     Partners: Male     Birth control/protection: OCP, Pill   Other Topics Concern     Not on file   Social History Narrative     Not on file     Social Determinants of Health     Financial Resource Strain: Not on file   Food Insecurity: Not on file   Transportation Needs: Not on file   Physical Activity: Not on file   Stress: Not on file   Social Connections: Not on file   Intimate Partner Violence: Not on file   Housing Stability: Not on file       Current Outpatient Medications:      norgestimate-ethinyl estradiol (ORTHO-CYCLEN) 0.25-35 MG-MCG tablet, Take 1 tablet by mouth daily Active tablets for prevention of menses, Disp: 112 tablet, Rfl: 3     Cholecalciferol (VITAMIN D) 2000 UNIT tablet, Take 1 tablet by mouth daily., Disp: , Rfl:      doxylamine (UNISOM) 25 MG TABS tablet, Take 25 mg by mouth At Bedtime, Disp: , Rfl:      ethynodiol-ethinyl estradiol (KELNOR) 1-35 MG-MCG tablet, Take 1 tablet by mouth daily Continuous therapy for prevention of menses, Disp: 112 tablet, Rfl: 4     FLUDROCORTISONE ACETATE 0.1 MG OR TABS, 1 TABLET DAILY, Disp: 30, Rfl: 0     hydrocortisone (CORTEF) 10 MG tablet, Take 10 mg by mouth daily. 10mg AM 10mg Lunch 5mg at Dinner, Disp: , Rfl:      levonorgest-eth estrad 91-Day (SEASONIQUE) 0.15-0.03 &0.01 MG tablet, Take 1 tablet by mouth daily, Disp: 91 tablet, Rfl: 3     levothyroxine (SYNTHROID/LEVOTHROID) 112 MCG tablet, Take 1 tablet (112 mcg) by mouth daily, Disp: , Rfl:      WOMENS DAILY MULTIVITAMIN OR TABS, 1 TABLET DAILY, Disp: 30, Rfl: 0     Allergies   Allergen Reactions     Pcn [Penicillins]      Sulfa Drugs        Past medical, surgical, social and family history were reviewed and updated in [facility].      EXAM:  /78   Pulse 74   Ht 1.638 m (5' 4.5\")   Wt 58.2 kg (128 lb 3.2 oz)   LMP 12/31/2021   SpO2 100%   BMI 21.67 kg/m     BMI: Body " mass index is 21.67 kg/m .  Constitutional: healthy, alert and no distress  Head: Normocephalic. No masses, lesions, tenderness or abnormalities  Neck: Neck supple. Trachea midline. No adenopathy. Thyroid symmetric, normal size.   Cardiovascular: RRR.   Respiratory: Negative.   Breast: Breasts reveal mild symmetric fibrocystic densities, but there are no dominant, discrete, fixed or suspicious masses found.  Gastrointestinal: Abdomen soft, non-tender, non-distended. No masses, organomegaly.  :  Vulva:  No external lesions, normal female hair distribution, no inguinal adenopathy.    Urethra:  Midline, non-tender, well supported, no discharge  Vagina:  Moist, pink, no abnormal discharge, no lesions  Uterus:  Normal size , non-tender, freely mobile  Ovaries:  No masses appreciated, non-tender, mobile  Rectal Exam: deferred  Musculoskeletal: extremities normal  Skin: no suspicious lesions or rashes  Psychiatric: Affect appropriate, cooperative,mentation appears normal.     COUNSELING:   Reviewed preventive health counseling, as reflected in patient instructions   reports that she has never smoked. She has never used smokeless tobacco.    Body mass index is 21.67 kg/m .    FRAX Risk Assessment    ASSESSMENT:  46 year old female with satisfactory annual exam  (Z01.419) Encounter for gynecological examination without abnormal finding  (primary encounter diagnosis)  Comment: OCP  Plan: norgestimate-ethinyl estradiol (ORTHO-CYCLEN)         0.25-35 MG-MCG tablet        Will try a different formulation and discussed keep trying different progesterones until can find the correct one. She has been on this one prior but since older now, try again.     Has tried Kelnor, seasonique, apri prior.  Discussed advanced care directives.     (Z12.4) Pap smear for cervical cancer screening  Plan: Pap imaged thin layer screen with HPV -         recommended age 30 - 65 years (select HPV order        below)        Discussed sending pap  smear for HPV typing as well and that if both pap and HPV are negative, then can have q5 year pap smears.    (Z13.0) Screening, anemia, deficiency, iron  Plan: CBC with platelets        Check lab today    (Z13.220) Screening for lipid disorders  Comment: fasting  Plan: Lipid panel reflex to direct LDL Fasting        Check lab today    (Z13.1) Screening for diabetes mellitus  Comment: fasting  Plan: Comprehensive metabolic panel, Hemoglobin A1c        Check lab today    (Z13.21) Encounter for vitamin deficiency screening  Plan: Vitamin D Deficiency        Check lab today    (E03.9) Hypothyroidism, unspecified type  Comment: addisons  Plan: T4, free, TSH, Adult Endocrinology          Referral        Check lab today and will find a new endocrinologist.    (Z11.59) Need for hepatitis C screening test  Comment: per MD  Plan: Hepatitis C antibody        Check lab today    (I78.1) Nevus, non-neoplastic  Comment: family history of basal cell  Plan: Adult Dermatology Referral        Fair skin and overdue for skin check.    Eveline Lopez MD

## 2022-02-09 LAB
BKR LAB AP GYN ADEQUACY: NORMAL
BKR LAB AP GYN INTERPRETATION: NORMAL
BKR LAB AP HPV REFLEX: NORMAL
BKR LAB AP PREVIOUS ABNORMAL: NORMAL
PATH REPORT.COMMENTS IMP SPEC: NORMAL
PATH REPORT.COMMENTS IMP SPEC: NORMAL
PATH REPORT.RELEVANT HX SPEC: NORMAL

## 2022-02-11 LAB
HUMAN PAPILLOMA VIRUS 16 DNA: NEGATIVE
HUMAN PAPILLOMA VIRUS 18 DNA: NEGATIVE
HUMAN PAPILLOMA VIRUS FINAL DIAGNOSIS: NORMAL
HUMAN PAPILLOMA VIRUS OTHER HR: NEGATIVE

## 2022-04-13 ENCOUNTER — OFFICE VISIT (OUTPATIENT)
Dept: ENDOCRINOLOGY | Facility: CLINIC | Age: 47
End: 2022-04-13
Attending: OBSTETRICS & GYNECOLOGY
Payer: COMMERCIAL

## 2022-04-13 VITALS
HEIGHT: 65 IN | SYSTOLIC BLOOD PRESSURE: 130 MMHG | DIASTOLIC BLOOD PRESSURE: 82 MMHG | BODY MASS INDEX: 21.77 KG/M2 | HEART RATE: 64 BPM | WEIGHT: 130.7 LBS

## 2022-04-13 DIAGNOSIS — E27.1 ADDISON'S DISEASE (H): Primary | ICD-10-CM

## 2022-04-13 DIAGNOSIS — E03.9 HYPOTHYROIDISM, UNSPECIFIED TYPE: ICD-10-CM

## 2022-04-13 PROCEDURE — 99205 OFFICE O/P NEW HI 60 MIN: CPT | Performed by: INTERNAL MEDICINE

## 2022-04-13 RX ORDER — FLUDROCORTISONE ACETATE 0.1 MG/1
0.1 TABLET ORAL DAILY
Qty: 90 TABLET | Refills: 1 | Status: SHIPPED | OUTPATIENT
Start: 2022-04-13 | End: 2022-10-20

## 2022-04-13 RX ORDER — LEVOTHYROXINE SODIUM 112 UG/1
112 TABLET ORAL DAILY
Qty: 90 TABLET | Refills: 1 | Status: SHIPPED | OUTPATIENT
Start: 2022-04-13 | End: 2022-10-20

## 2022-04-13 RX ORDER — HYDROCORTISONE 10 MG/1
10 TABLET ORAL DAILY
Qty: 250 TABLET | Refills: 1 | Status: SHIPPED | OUTPATIENT
Start: 2022-04-13 | End: 2022-06-20

## 2022-04-13 NOTE — PATIENT INSTRUCTIONS
-Change levothyroxine to bedtime dosing  -Move last hydrocortisone dose to around 3 PM and see if it impacts sleep  -Continue remainder of medications without changes  -Morning lab draw in 6 weeks--fasting (inluding no water aside from a few sips for meds) overnight  -Get a medical alert bracelet, necklace or card for wallet  -We will request records from previous endocrinologist  -Return for a follow-up visit in 3 months--consider adjusting hydrocortisone dose down at that time  -We will communicate results via [facility], or if needed by phone

## 2022-04-13 NOTE — LETTER
4/13/2022         RE: Ashia Ying  [address] [place] [address]        Dear Colleague,    Thank you for referring your patient, Ashia Ying, to the [facility]. Please see a copy of my visit note below.      ENDOCRINOLOGY NEW PATIENT VISIT        HISTORY OF PRESENT ILLNESS    Ashia Ying is seen in consultation at the request of Dr. Lopez for hypothyroidism and adrenal insufficiency.    Patient previously followed at the [facility] of endocrinology, transferring care after a move.    1.  Yell's disease.  Primary adrenal insufficiency was diagnosed in her early 20s after she presented in crisis after progressing symptoms of abdominal pain and fatigue.  She has been on this dose of hydrocortisone for many years, although uncertain how long: taking hydrocortisone 10 mg in the morning with breakfast (around 7 AM), 10 mg at lunch (around noon) and 5 mg in the evening with dinner (around 6 PM).  She is also taking fludrocortisone 0.1 mg daily.    Endorses mild fatigue.  No nausea.  Appetite is good.  Bowel movements are regular.  No edema, although she occasionally feels somewhat puffy.    Has not been sleeping well: Has struggled with insomnia for the past 13 years.  Unisom helps somewhat, although she can initiate sleep without issue and has difficulty with waking in the middle of the night.    Does not have a medical alert bracelet currently.    Rarely has to increase hydrocortisone dose for sick day/stress.    No known family history of autoimmune disease including adrenal insufficiency, thyroid disease, rheumatoid arthritis, lupus, or vitiligo.    2.  Hypothyroidism.  Diagnosed within about a year of diagnosis of adrenal insufficiency.  Presently taking levothyroxine 112 mcg daily.  Takes levothyroxine in the morning, along with her other medications and coffee.    As above, endorses mild fatigue.  Bowel movements are regular.    She has a  history of intermenstrual spotting and has been on OCP as prescribed by her gynecologist.  Cycles occur generally regularly after placebo week.  She is , with 1 pregnancy ending in miscarriage.  No history of infertility/difficulty with conceiving.  Does not plan to have children in the future.    Pertinent Social History: , has an 11-year-old and 13-year-old child.  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    PAST MEDICAL HISTORY  Past Medical History:   Diagnosis Date     Allergic rhinitis, cause unspecified      Chronic lymphocytic thyroiditis      Corticoadrenal insufficiency     Dr. Freddie AGUILAR of [place] [facility]     Extrinsic asthma, unspecified      Other chronic allergic conjunctivitis     Allergic conjunctivitis     Other forms of retinal detachment(361.89)      Unspecified hypothyroidism        MEDICATIONS  Current Outpatient Medications   Medication Sig Dispense Refill     Cholecalciferol (VITAMIN D) 2000 UNIT tablet Take 1 tablet by mouth daily.       doxylamine (UNISOM) 25 MG TABS tablet Take 25 mg by mouth At Bedtime       FLUDROCORTISONE ACETATE 0.1 MG OR TABS Take 0.1 mg by mouth daily 30 0     hydrocortisone (CORTEF) 10 MG tablet Take 10 mg by mouth daily 10mg AM 10mg Lunch 5mg at Dinner       levothyroxine (SYNTHROID/LEVOTHROID) 112 MCG tablet Take 1 tablet (112 mcg) by mouth daily       norgestimate-ethinyl estradiol (ORTHO-CYCLEN) 0.25-35 MG-MCG tablet Take 1 tablet by mouth daily Active tablets for prevention of menses 112 tablet 3     WOMENS DAILY MULTIVITAMIN OR TABS 1 TABLET DAILY 30 0       Allergies, family, and social history were reviewed and documented as needed in EHR.     REVIEW OF SYSTEMS  A complete 10-point ROS was performed and pertinent positives and negatives are noted in the HPI.    PHYSICAL EXAM  /82 (BP Location: Right arm, Patient Position: Sitting, Cuff Size: Adult  "Regular)   Pulse 64   Ht 1.642 m (5' 4.65\")   Wt 59.3 kg (130 lb 11.2 oz)   BMI 21.99 kg/m    Body mass index is 21.99 kg/m .  Constitutional: Vital signs reviewed, as recorded above. Patient is alert, oriented and appears in no acute distress.  Eyes: Prominent eyes, although patient notes that this is typical for her; PER, EOMI, no stare, lid lag, or retraction; no conjunctival injection.  ENMT: OP clear with moist MM. Lips are without lesions.   Neck: Neck supple, no palpable thyromegaly.  Lymphatic: No cervical or supraclavicular LAD.  Cardiovascular: RRR, normal S1/S2, no audible murmurs, rubs or gallops; no LE edema.  Respiratory: CTAB, without wheezes, crackles or rhonchi; normal chest wall motion and respiratory effort.  GI: Positive bowel sounds, soft, NT/ND.  MSK: No clubbing or cyanosis; normal muscle bulk and tone.  Skin: Normal skin color, temperature, turgor and texture.  Neurological: Alert and oriented times 3. No tremor.    DATA REVIEW  Each of the following laboratory and/or imaging studies were reviewed.    Office Visit on 02/04/2022   Component Date Value Ref Range Status     Interpretation 02/04/2022 Negative for Intraepithelial Lesion or Malignancy (NILM)    Final     Comment 02/04/2022    Final                    Value:This result contains rich text formatting which cannot be displayed here.     Specimen Adequacy 02/04/2022 Satisfactory for evaluation, endocervical/transformation zone component present   Final     Clinical Information 02/04/2022    Final                    Value:This result contains rich text formatting which cannot be displayed here.     Reflex Testing 02/04/2022 Yes regardless of result   Final     Previous Abnormal? 02/04/2022    Final                    Value:This result contains rich text formatting which cannot be displayed here.     Performing Labs 02/04/2022    Final                    Value:This result contains rich text formatting which cannot be displayed here. "     WBC Count 02/04/2022 14.2 (A) 4.0 - 11.0 10e3/uL Final     RBC Count 02/04/2022 4.71  3.80 - 5.20 10e6/uL Final     Hemoglobin 02/04/2022 14.4  11.7 - 15.7 g/dL Final     Hematocrit 02/04/2022 44.5  35.0 - 47.0 % Final     MCV 02/04/2022 95  78 - 100 fL Final     MCH 02/04/2022 30.6  26.5 - 33.0 pg Final     MCHC 02/04/2022 32.4  31.5 - 36.5 g/dL Final     RDW 02/04/2022 14.3  10.0 - 15.0 % Final     Platelet Count 02/04/2022 312  150 - 450 10e3/uL Final     Sodium 02/04/2022 136  133 - 144 mmol/L Final     Potassium 02/04/2022 3.1 (A) 3.4 - 5.3 mmol/L Final     Chloride 02/04/2022 101  94 - 109 mmol/L Final     Carbon Dioxide (CO2) 02/04/2022 26  20 - 32 mmol/L Final     Anion Gap 02/04/2022 9  3 - 14 mmol/L Final     Urea Nitrogen 02/04/2022 13  7 - 30 mg/dL Final     Creatinine 02/04/2022 0.83  0.52 - 1.04 mg/dL Final     Calcium 02/04/2022 9.1  8.5 - 10.1 mg/dL Final     Glucose 02/04/2022 74  70 - 99 mg/dL Final     Alkaline Phosphatase 02/04/2022 54  40 - 150 U/L Final     AST 02/04/2022 17  0 - 45 U/L Final     ALT 02/04/2022 30  0 - 50 U/L Final     Protein Total 02/04/2022 7.6  6.8 - 8.8 g/dL Final     Albumin 02/04/2022 3.7  3.4 - 5.0 g/dL Final     Bilirubin Total 02/04/2022 0.4  0.2 - 1.3 mg/dL Final     GFR Estimate 02/04/2022 89  >60 mL/min/1.73m2 Final    Effective December 21, 2021 eGFRcr in adults is calculated using the 2021 CKD-EPI creatinine equation which includes age and gender ( et al., NEJ, DOI: 10.1056/DFGBuo5984025)     Hemoglobin A1C 02/04/2022 5.1  0.0 - 5.6 % Final    Normal <5.7%   Prediabetes 5.7-6.4%    Diabetes 6.5% or higher     Note: Adopted from ADA consensus guidelines.     Cholesterol 02/04/2022 216 (A) <200 mg/dL Final     Triglycerides 02/04/2022 191 (A) <150 mg/dL Final     Direct Measure HDL 02/04/2022 89  >=50 mg/dL Final     LDL Cholesterol Calculated 02/04/2022 89  <=100 mg/dL Final     Non HDL Cholesterol 02/04/2022 127  <130 mg/dL Final     Patient Fasting >  8hrs? 02/04/2022 Yes   Final     Vitamin D, Total (25-Hydroxy) 02/04/2022 60  20 - 75 ug/L Final     Free T4 02/04/2022 1.48 (A) 0.76 - 1.46 ng/dL Final     TSH 02/04/2022 1.48  0.40 - 4.00 mU/L Final     Hepatitis C Antibody 02/04/2022 Nonreactive  Nonreactive Final     Other HR HPV 02/04/2022 Negative  Negative Final     HPV16 DNA 02/04/2022 Negative  Negative Final     HPV18 DNA 02/04/2022 Negative  Negative Final     FINAL DIAGNOSIS 02/04/2022    Final                    Value:This result contains rich text formatting which cannot be displayed here.         ASSESSMENT  1.  Adrenal insufficiency, primary.  Endorses mild fatigue and some orthostatic symptoms, raising concern for insufficient mineralocorticoid and glucocorticoid dosing.  However, she has had hypokalemia and leukocytosis noted on prior labs which would suggest the opposite; moreover, her dose of hydrocortisone is slightly higher than what I would estimate based on her BSA.  We will check orthostatics today to ensure fludrocortisone is optimally dosed.  In the long-term, would consider reducing hydrocortisone dose (perhaps halving third dose of hydrocortisone).  We reviewed precautions to take for emergency/sick day management of hydrocortisone; she will get a medical alert card to place in her wallet at a minimum.  Prefers to defer getting IM hydrocortisone for emergencies at present: She will let me know if she would like this filled in the future.    2.  Hypothyroidism.  Primary.  Clinically appears to be euthyroid and last TSH was normal in 2/2022 (mildly elevated free T4, although I wonder if this may be an assay issue).  To ensure maximal absorption, would switch levothyroxine to bedtime administration.  Check thyroid function tests about 6 weeks after making this change.    3.  Increased thirst and urination.  She has endorsed a dry eyes and dry mouth so I wonder if she may be drinking to ease her dry mouth symptoms.  Normal hemoglobin A1c in  2/2022.  Check fasting serum and urine osmolality to rule out diabetes insipidus.  If symptoms of dry mouth and dry eyes persist, could consider referral to rheumatology in the future--given her other coexisting autoimmune conditions, would be at increased risk for Sjogren's/sicca syndrome.      PLAN  -Change levothyroxine to bedtime dosing  -Move last hydrocortisone dose to around 3 PM and see if it impacts sleep  -Continue remainder of medications without changes  -Morning lab draw in 6 weeks--fasting (including no water aside from a few sips for meds) overnight  -Get a medical alert bracelet, necklace or card for wallet  -We will request records from previous endocrinologist  -Return for a follow-up visit in 3 months--consider adjusting hydrocortisone dose down at that time  -We will communicate results via [employer], or if needed by phone    Addendum: Orthostatics performed at clinic visit.  Blood pressure lying down 132/78, pulse 60.  After standing 1 minute, blood pressure 122/78, pulse 72.  After standing 3 minutes blood pressure 138/84, pulse 68.    Orders Placed This Encounter   Procedures     Adrenal corticotropin     Comprehensive metabolic panel     TSH with free T4 reflex     Osmolality     Osmolality, random urine       I spent a total of 60 minutes on the date of encounter reviewing medical records, evaluating the patient, coordinating care and documenting in the EHR, as detailed above.      Perlita Moctezuma MD   Division of Diabetes, Endocrinology and Metabolism  Department of Medicine      cc: Eveline Lopez MD             Again, thank you for allowing me to participate in the care of your patient.        Sincerely,        PERLITA Moctezuma MD

## 2022-04-13 NOTE — PROGRESS NOTES
ENDOCRINOLOGY NEW PATIENT VISIT        HISTORY OF PRESENT ILLNESS    Ashia Ying is seen in consultation at the request of Dr. Lopez for hypothyroidism and adrenal insufficiency.    Patient previously followed at the [facility] of endocrinology, transferring care after a move.    1.  Nickolas's disease.  Primary adrenal insufficiency was diagnosed in her early 20s after she presented in crisis after progressing symptoms of abdominal pain and fatigue.  She has been on this dose of hydrocortisone for many years, although uncertain how long: taking hydrocortisone 10 mg in the morning with breakfast (around 7 AM), 10 mg at lunch (around noon) and 5 mg in the evening with dinner (around 6 PM).  She is also taking fludrocortisone 0.1 mg daily.    Endorses mild fatigue.  No nausea.  Appetite is good.  Bowel movements are regular.  No edema, although she occasionally feels somewhat puffy.    Has not been sleeping well: Has struggled with insomnia for the past 13 years.  Unisom helps somewhat, although she can initiate sleep without issue and has difficulty with waking in the middle of the night.    Does not have a medical alert bracelet currently.    Rarely has to increase hydrocortisone dose for sick day/stress.    No known family history of autoimmune disease including adrenal insufficiency, thyroid disease, rheumatoid arthritis, lupus, or vitiligo.    2.  Hypothyroidism.  Diagnosed within about a year of diagnosis of adrenal insufficiency.  Presently taking levothyroxine 112 mcg daily.  Takes levothyroxine in the morning, along with her other medications and coffee.    As above, endorses mild fatigue.  Bowel movements are regular.    She has a history of intermenstrual spotting and has been on OCP as prescribed by her gynecologist.  Cycles occur generally regularly after placebo week.  She is , with 1 pregnancy ending in miscarriage.  No history of infertility/difficulty with conceiving.  Does  "not plan to have children in the future.    Pertinent Social History: , has an 11-year-old and 13-year-old child.  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    PAST MEDICAL HISTORY  Past Medical History:   Diagnosis Date     Allergic rhinitis, cause unspecified      Chronic lymphocytic thyroiditis      Corticoadrenal insufficiency     Dr. Freddie AGUILAR of [place] [facility]     Extrinsic asthma, unspecified      Other chronic allergic conjunctivitis     Allergic conjunctivitis     Other forms of retinal detachment(361.89)      Unspecified hypothyroidism        MEDICATIONS  Current Outpatient Medications   Medication Sig Dispense Refill     Cholecalciferol (VITAMIN D) 2000 UNIT tablet Take 1 tablet by mouth daily.       doxylamine (UNISOM) 25 MG TABS tablet Take 25 mg by mouth At Bedtime       FLUDROCORTISONE ACETATE 0.1 MG OR TABS Take 0.1 mg by mouth daily 30 0     hydrocortisone (CORTEF) 10 MG tablet Take 10 mg by mouth daily 10mg AM 10mg Lunch 5mg at Dinner       levothyroxine (SYNTHROID/LEVOTHROID) 112 MCG tablet Take 1 tablet (112 mcg) by mouth daily       norgestimate-ethinyl estradiol (ORTHO-CYCLEN) 0.25-35 MG-MCG tablet Take 1 tablet by mouth daily Active tablets for prevention of menses 112 tablet 3     WOMENS DAILY MULTIVITAMIN OR TABS 1 TABLET DAILY 30 0       Allergies, family, and social history were reviewed and documented as needed in EHR.     REVIEW OF SYSTEMS  A complete 10-point ROS was performed and pertinent positives and negatives are noted in the HPI.    PHYSICAL EXAM  /82 (BP Location: Right arm, Patient Position: Sitting, Cuff Size: Adult Regular)   Pulse 64   Ht 1.642 m (5' 4.65\")   Wt 59.3 kg (130 lb 11.2 oz)   BMI 21.99 kg/m    Body mass index is 21.99 kg/m .  Constitutional: Vital signs reviewed, as recorded above. Patient is alert, oriented and appears in no acute distress.  Eyes: " Prominent eyes, although patient notes that this is typical for her; PER, EOMI, no stare, lid lag, or retraction; no conjunctival injection.  ENMT: OP clear with moist MM. Lips are without lesions.   Neck: Neck supple, no palpable thyromegaly.  Lymphatic: No cervical or supraclavicular LAD.  Cardiovascular: RRR, normal S1/S2, no audible murmurs, rubs or gallops; no LE edema.  Respiratory: CTAB, without wheezes, crackles or rhonchi; normal chest wall motion and respiratory effort.  GI: Positive bowel sounds, soft, NT/ND.  MSK: No clubbing or cyanosis; normal muscle bulk and tone.  Skin: Normal skin color, temperature, turgor and texture.  Neurological: Alert and oriented times 3. No tremor.    DATA REVIEW  Each of the following laboratory and/or imaging studies were reviewed.    Office Visit on 02/04/2022   Component Date Value Ref Range Status     Interpretation 02/04/2022 Negative for Intraepithelial Lesion or Malignancy (NILM)    Final     Comment 02/04/2022    Final                    Value:This result contains rich text formatting which cannot be displayed here.     Specimen Adequacy 02/04/2022 Satisfactory for evaluation, endocervical/transformation zone component present   Final     Clinical Information 02/04/2022    Final                    Value:This result contains rich text formatting which cannot be displayed here.     Reflex Testing 02/04/2022 Yes regardless of result   Final     Previous Abnormal? 02/04/2022    Final                    Value:This result contains rich text formatting which cannot be displayed here.     Performing Labs 02/04/2022    Final                    Value:This result contains rich text formatting which cannot be displayed here.     WBC Count 02/04/2022 14.2 (A) 4.0 - 11.0 10e3/uL Final     RBC Count 02/04/2022 4.71  3.80 - 5.20 10e6/uL Final     Hemoglobin 02/04/2022 14.4  11.7 - 15.7 g/dL Final     Hematocrit 02/04/2022 44.5  35.0 - 47.0 % Final     MCV 02/04/2022 95  78 - 100 fL  Final     MCH 02/04/2022 30.6  26.5 - 33.0 pg Final     MCHC 02/04/2022 32.4  31.5 - 36.5 g/dL Final     RDW 02/04/2022 14.3  10.0 - 15.0 % Final     Platelet Count 02/04/2022 312  150 - 450 10e3/uL Final     Sodium 02/04/2022 136  133 - 144 mmol/L Final     Potassium 02/04/2022 3.1 (A) 3.4 - 5.3 mmol/L Final     Chloride 02/04/2022 101  94 - 109 mmol/L Final     Carbon Dioxide (CO2) 02/04/2022 26  20 - 32 mmol/L Final     Anion Gap 02/04/2022 9  3 - 14 mmol/L Final     Urea Nitrogen 02/04/2022 13  7 - 30 mg/dL Final     Creatinine 02/04/2022 0.83  0.52 - 1.04 mg/dL Final     Calcium 02/04/2022 9.1  8.5 - 10.1 mg/dL Final     Glucose 02/04/2022 74  70 - 99 mg/dL Final     Alkaline Phosphatase 02/04/2022 54  40 - 150 U/L Final     AST 02/04/2022 17  0 - 45 U/L Final     ALT 02/04/2022 30  0 - 50 U/L Final     Protein Total 02/04/2022 7.6  6.8 - 8.8 g/dL Final     Albumin 02/04/2022 3.7  3.4 - 5.0 g/dL Final     Bilirubin Total 02/04/2022 0.4  0.2 - 1.3 mg/dL Final     GFR Estimate 02/04/2022 89  >60 mL/min/1.73m2 Final    Effective December 21, 2021 eGFRcr in adults is calculated using the 2021 CKD-EPI creatinine equation which includes age and gender (Octavio et al., NEJM, DOI: 10.1056/EZBDer7513137)     Hemoglobin A1C 02/04/2022 5.1  0.0 - 5.6 % Final    Normal <5.7%   Prediabetes 5.7-6.4%    Diabetes 6.5% or higher     Note: Adopted from ADA consensus guidelines.     Cholesterol 02/04/2022 216 (A) <200 mg/dL Final     Triglycerides 02/04/2022 191 (A) <150 mg/dL Final     Direct Measure HDL 02/04/2022 89  >=50 mg/dL Final     LDL Cholesterol Calculated 02/04/2022 89  <=100 mg/dL Final     Non HDL Cholesterol 02/04/2022 127  <130 mg/dL Final     Patient Fasting > 8hrs? 02/04/2022 Yes   Final     Vitamin D, Total (25-Hydroxy) 02/04/2022 60  20 - 75 ug/L Final     Free T4 02/04/2022 1.48 (A) 0.76 - 1.46 ng/dL Final     TSH 02/04/2022 1.48  0.40 - 4.00 mU/L Final     Hepatitis C Antibody 02/04/2022 Nonreactive   Nonreactive Final     Other HR HPV 02/04/2022 Negative  Negative Final     HPV16 DNA 02/04/2022 Negative  Negative Final     HPV18 DNA 02/04/2022 Negative  Negative Final     FINAL DIAGNOSIS 02/04/2022    Final                    Value:This result contains rich text formatting which cannot be displayed here.         ASSESSMENT  1.  Adrenal insufficiency, primary.  Endorses mild fatigue and some orthostatic symptoms, raising concern for insufficient mineralocorticoid and glucocorticoid dosing.  However, she has had hypokalemia and leukocytosis noted on prior labs which would suggest the opposite; moreover, her dose of hydrocortisone is slightly higher than what I would estimate based on her BSA.  We will check orthostatics today to ensure fludrocortisone is optimally dosed.  In the long-term, would consider reducing hydrocortisone dose (perhaps halving third dose of hydrocortisone).  We reviewed precautions to take for emergency/sick day management of hydrocortisone; she will get a medical alert card to place in her wallet at a minimum.  Prefers to defer getting IM hydrocortisone for emergencies at present: She will let me know if she would like this filled in the future.    2.  Hypothyroidism.  Primary.  Clinically appears to be euthyroid and last TSH was normal in 2/2022 (mildly elevated free T4, although I wonder if this may be an assay issue).  To ensure maximal absorption, would switch levothyroxine to bedtime administration.  Check thyroid function tests about 6 weeks after making this change.    3.  Increased thirst and urination.  She has endorsed a dry eyes and dry mouth so I wonder if she may be drinking to ease her dry mouth symptoms.  Normal hemoglobin A1c in 2/2022.  Check fasting serum and urine osmolality to rule out diabetes insipidus.  If symptoms of dry mouth and dry eyes persist, could consider referral to rheumatology in the future--given her other coexisting autoimmune conditions, would be at  increased risk for Sjogren's/sicca syndrome.      PLAN  -Change levothyroxine to bedtime dosing  -Move last hydrocortisone dose to around 3 PM and see if it impacts sleep  -Continue remainder of medications without changes  -Morning lab draw in 6 weeks--fasting (including no water aside from a few sips for meds) overnight  -Get a medical alert bracelet, necklace or card for wallet  -We will request records from previous endocrinologist  -Return for a follow-up visit in 3 months--consider adjusting hydrocortisone dose down at that time  -We will communicate results via [employer], or if needed by phone    Addendum: Orthostatics performed at clinic visit.  Blood pressure lying down 132/78, pulse 60.  After standing 1 minute, blood pressure 122/78, pulse 72.  After standing 3 minutes blood pressure 138/84, pulse 68.    Orders Placed This Encounter   Procedures     Adrenal corticotropin     Comprehensive metabolic panel     TSH with free T4 reflex     Osmolality     Osmolality, random urine       I spent a total of 60 minutes on the date of encounter reviewing medical records, evaluating the patient, coordinating care and documenting in the EHR, as detailed above.      Leah Moctezuma MD   Division of Diabetes, Endocrinology and Metabolism  Department of Medicine      cc: Eveline Lopez MD

## 2022-05-25 ENCOUNTER — LAB (OUTPATIENT)
Dept: LAB | Facility: CLINIC | Age: 47
End: 2022-05-25

## 2022-05-25 ENCOUNTER — OFFICE VISIT (OUTPATIENT)
Dept: DERMATOLOGY | Facility: CLINIC | Age: 47
End: 2022-05-25
Payer: COMMERCIAL

## 2022-05-25 VITALS — OXYGEN SATURATION: 98 % | SYSTOLIC BLOOD PRESSURE: 142 MMHG | HEART RATE: 74 BPM | DIASTOLIC BLOOD PRESSURE: 80 MMHG

## 2022-05-25 DIAGNOSIS — D48.5 NEOPLASM OF UNCERTAIN BEHAVIOR OF SKIN: ICD-10-CM

## 2022-05-25 DIAGNOSIS — D22.9 MULTIPLE BENIGN NEVI: ICD-10-CM

## 2022-05-25 DIAGNOSIS — L81.4 LENTIGO: ICD-10-CM

## 2022-05-25 DIAGNOSIS — E03.9 HYPOTHYROIDISM, UNSPECIFIED TYPE: ICD-10-CM

## 2022-05-25 DIAGNOSIS — L82.1 SEBORRHEIC KERATOSIS: Primary | ICD-10-CM

## 2022-05-25 DIAGNOSIS — E27.1 ADDISON'S DISEASE (H): ICD-10-CM

## 2022-05-25 DIAGNOSIS — D18.01 CHERRY ANGIOMA: ICD-10-CM

## 2022-05-25 LAB
ACTH PLAS-MCNC: <10 PG/ML
ALBUMIN SERPL-MCNC: 3.5 G/DL (ref 3.5–5)
ALP SERPL-CCNC: 54 U/L (ref 45–120)
ALT SERPL W P-5'-P-CCNC: 24 U/L (ref 0–45)
ANION GAP SERPL CALCULATED.3IONS-SCNC: 13 MMOL/L (ref 5–18)
AST SERPL W P-5'-P-CCNC: 20 U/L (ref 0–40)
BILIRUB SERPL-MCNC: 0.4 MG/DL (ref 0–1)
BUN SERPL-MCNC: 11 MG/DL (ref 8–22)
CALCIUM SERPL-MCNC: 9.6 MG/DL (ref 8.5–10.5)
CHLORIDE BLD-SCNC: 101 MMOL/L (ref 98–107)
CO2 SERPL-SCNC: 27 MMOL/L (ref 22–31)
CREAT SERPL-MCNC: 0.8 MG/DL (ref 0.6–1.1)
GFR SERPL CREATININE-BSD FRML MDRD: >90 ML/MIN/1.73M2
GLUCOSE BLD-MCNC: 75 MG/DL (ref 70–125)
OSMOLALITY SERPL: 293 MMOL/KG (ref 275–295)
POTASSIUM BLD-SCNC: 3.8 MMOL/L (ref 3.5–5)
PROT SERPL-MCNC: 6.8 G/DL (ref 6–8)
SODIUM SERPL-SCNC: 141 MMOL/L (ref 136–145)
TSH SERPL DL<=0.005 MIU/L-ACNC: 2.94 UIU/ML (ref 0.3–5)

## 2022-05-25 PROCEDURE — 84443 ASSAY THYROID STIM HORMONE: CPT

## 2022-05-25 PROCEDURE — 83930 ASSAY OF BLOOD OSMOLALITY: CPT

## 2022-05-25 PROCEDURE — 82024 ASSAY OF ACTH: CPT

## 2022-05-25 PROCEDURE — 99203 OFFICE O/P NEW LOW 30 MIN: CPT | Mod: 25 | Performed by: PHYSICIAN ASSISTANT

## 2022-05-25 PROCEDURE — 88305 TISSUE EXAM BY PATHOLOGIST: CPT | Performed by: DERMATOLOGY

## 2022-05-25 PROCEDURE — 11102 TANGNTL BX SKIN SINGLE LES: CPT | Performed by: PHYSICIAN ASSISTANT

## 2022-05-25 PROCEDURE — 80053 COMPREHEN METABOLIC PANEL: CPT

## 2022-05-25 PROCEDURE — 83935 ASSAY OF URINE OSMOLALITY: CPT

## 2022-05-25 PROCEDURE — 36415 COLL VENOUS BLD VENIPUNCTURE: CPT

## 2022-05-25 ASSESSMENT — PAIN SCALES - GENERAL: PAINLEVEL: NO PAIN (0)

## 2022-05-25 NOTE — NURSING NOTE
Ashia Ying was evaluated in a specialty clinic today at which time her blood pressure was noted to be elevated. Patient states that her endocrinologist is aware of her elevated blood pressure values and is monitoring this. Currently arjun.    Daniel CARTER

## 2022-05-25 NOTE — PROGRESS NOTES
"Ashia Ying is an extremely pleasant 46 year old year old female patient here today for mole check. She denies any blistering sunburns and no tanning bed usage. She notes she has had a few peeling sunburns as a child. Patient has no other skin complaints today.  Remainder of the HPI, Meds, PMH, Allergies, FH, and SH was reviewed in chart.    Pertinent Hx:   No personal history of skin cancer.   Past Medical History:   Diagnosis Date     Allergic rhinitis, cause unspecified      Chronic lymphocytic thyroiditis      Corticoadrenal insufficiency     Dr. Freddie AGUILAR of [place] [facility]     Extrinsic asthma, unspecified      Other chronic allergic conjunctivitis     Allergic conjunctivitis     Other forms of retinal detachment(361.89)      Unspecified hypothyroidism        Past Surgical History:   Procedure Laterality Date     addisonian crisis  2002     COLONOSCOPY N/A 3/8/2021    Procedure: COLONOSCOPY, WITH POLYPECTOMY;  Surgeon: Natalia Bangura MD;  Location: [facility] [place] OR      TOOTH EXTRACTION W/FORCEP       ZZC REPAIR DETACH RETINA,SCLERAL BUCKLE  2003        Family History   Problem Relation Age of Onset     Hypertension Mother      Arthritis Mother      Osteopenia Mother      Colon Polyps Mother      Cancer Father         basal cell cancer     Cerebrovascular Disease Father         CVA     Hypertension Father      Other - See Comments Father         stroke     Basal cell carcinoma Father      Colon Polyps Sister 42     Depression Sister      Asthma Sister      Asthma Sister      Cardiovascular Maternal Grandmother         \"heart troubles\"     Breast Cancer Maternal Grandmother      Osteoporosis Maternal Grandmother      Asthma Paternal Grandmother      Cerebrovascular Disease Paternal Grandmother      Prostate Cancer Paternal Grandfather      Basal cell carcinoma Paternal Uncle      Diabetes No family hx of        Social History     Socioeconomic History     Marital status:      Spouse name: Emmett" Number of children: 2     Years of education: Not on file     Highest education level: Not on file   Occupational History     Employer: [employer]   Tobacco Use     Smoking status: Never Smoker     Smokeless tobacco: Never Used   Substance and Sexual Activity     Alcohol use: Yes     Comment: 1-2 per week     Drug use: No     Sexual activity: Yes     Partners: Male     Birth control/protection: OCP, Pill   Other Topics Concern     Not on file   Social History Narrative     Not on file     Social Determinants of Health     Financial Resource Strain: Not on file   Food Insecurity: Not on file   Transportation Needs: Not on file   Physical Activity: Not on file   Stress: Not on file   Social Connections: Not on file   Intimate Partner Violence: Not on file   Housing Stability: Not on file       Outpatient Encounter Medications as of 5/25/2022   Medication Sig Dispense Refill     Cholecalciferol (VITAMIN D) 2000 UNIT tablet Take 1 tablet by mouth daily.       doxylamine (UNISOM) 25 MG TABS tablet Take 25 mg by mouth At Bedtime       fludrocortisone (FLORINEF) 0.1 MG tablet Take 1 tablet (0.1 mg) by mouth daily 90 tablet 1     hydrocortisone (CORTEF) 10 MG tablet Take 1 tablet (10 mg) by mouth daily 10mg PO Q AM, 10mg PO q noon, 5mg PO mid-afternoon around 3 PM. Double or triple dose for 3 days for moderate illness as needed. 250 tablet 1     levothyroxine (SYNTHROID/LEVOTHROID) 112 MCG tablet Take 1 tablet (112 mcg) by mouth daily 90 tablet 1     norgestimate-ethinyl estradiol (ORTHO-CYCLEN) 0.25-35 MG-MCG tablet Take 1 tablet by mouth daily Active tablets for prevention of menses 112 tablet 3     WOMENS DAILY MULTIVITAMIN OR TABS 1 TABLET DAILY 30 0     No facility-administered encounter medications on file as of 5/25/2022.             O:   NAD, WDWN, Alert & Oriented, Mood & Affect wnl, Vitals stable   Here today alone   BP (!) 146/86   Pulse 74   SpO2 98%    General appearance normal   Vitals stable   Alert,  oriented and in no acute distress     0.4 cm light brown papule on right jaw   Stuck on papules and brown macules on trunk and ext   Red papules on trunk  Brown papules and macules with regular pigment network and borders on torso and extremities     The remainder of skin exam is normal       Eyes: Conjunctivae/lids:Normal     ENT: Lips: normal    MSK:Normal    Cardiovascular: peripheral edema none    Pulm: Breathing Normal     Neuro/Psych: Orientation:Alert and Orientedx3 ; Mood/Affect:normal     A/P:  1. R/O intradermal nevus on right jaw  TANGENTIAL BIOPSY SENT OUT:  After consent, anesthesia with LEC and prep, tangential excision performed and specimen sent out for permanent section histology.  No complications and routine wound care. Patient told to call our office in 1-2 weeks for result.      2. Seborrheic keratosis, lentigo, angioma, benign nevi   It was a pleasure speaking to Ashia Ying today.  BENIGN LESIONS DISCUSSED WITH PATIENT:  I discussed the specifics of tumor, prognosis, and genetics of benign lesions.  I explained that treatment of these lesions would be purely cosmetic and not medically neccessary.  I discussed with patient different removal options including excision, cautery and /or laser.      Nature and genetics of benign skin lesions dicussed with patient.  Signs and Symptoms of skin cancer discussed with patient.  ABCDEs of melanoma reviewed with patient.  Patient encouraged to perform monthly skin exams.  UV precautions reviewed with patient.  Risks of non-melanoma skin cancer discussed with patient   Return to clinic pending biopsy results.

## 2022-05-25 NOTE — LETTER
"    5/25/2022         RE: Ashia Ying  [address] [place] [address]        Dear Colleague,    Thank you for referring your patient, Ashia Ying, to the [facility]. Please see a copy of my visit note below.    Ashia Ying is an extremely pleasant 46 year old year old female patient here today for mole check. She denies any blistering sunburns and no tanning bed usage. She notes she has had a few peeling sunburns as a child. Patient has no other skin complaints today.  Remainder of the HPI, Meds, PMH, Allergies, FH, and SH was reviewed in chart.    Pertinent Hx:   No personal history of skin cancer.   Past Medical History:   Diagnosis Date     Allergic rhinitis, cause unspecified      Chronic lymphocytic thyroiditis      Corticoadrenal insufficiency     Dr. Freddie AGUILAR of [place] [facility]     Extrinsic asthma, unspecified      Other chronic allergic conjunctivitis     Allergic conjunctivitis     Other forms of retinal detachment(361.89)      Unspecified hypothyroidism        Past Surgical History:   Procedure Laterality Date     addisonian crisis  2002     COLONOSCOPY N/A 3/8/2021    Procedure: COLONOSCOPY, WITH POLYPECTOMY;  Surgeon: Natalia Bangura MD;  Location: [facility] [place] OR      TOOTH EXTRACTION W/FORCEP       ZZC REPAIR DETACH RETINA,SCLERAL BUCKLE  2003        Family History   Problem Relation Age of Onset     Hypertension Mother      Arthritis Mother      Osteopenia Mother      Colon Polyps Mother      Cancer Father         basal cell cancer     Cerebrovascular Disease Father         CVA     Hypertension Father      Other - See Comments Father         stroke     Basal cell carcinoma Father      Colon Polyps Sister 42     Depression Sister      Asthma Sister      Asthma Sister      Cardiovascular Maternal Grandmother         \"heart troubles\"     Breast Cancer Maternal Grandmother      Osteoporosis Maternal Grandmother      Asthma Paternal Grandmother      " Cerebrovascular Disease Paternal Grandmother      Prostate Cancer Paternal Grandfather      Basal cell carcinoma Paternal Uncle      Diabetes No family hx of        Social History     Socioeconomic History     Marital status:      Spouse name: Emmett     Number of children: 2     Years of education: Not on file     Highest education level: Not on file   Occupational History     Employer: [employer]   Tobacco Use     Smoking status: Never Smoker     Smokeless tobacco: Never Used   Substance and Sexual Activity     Alcohol use: Yes     Comment: 1-2 per week     Drug use: No     Sexual activity: Yes     Partners: Male     Birth control/protection: OCP, Pill   Other Topics Concern     Not on file   Social History Narrative     Not on file     Social Determinants of Health     Financial Resource Strain: Not on file   Food Insecurity: Not on file   Transportation Needs: Not on file   Physical Activity: Not on file   Stress: Not on file   Social Connections: Not on file   Intimate Partner Violence: Not on file   Housing Stability: Not on file       Outpatient Encounter Medications as of 5/25/2022   Medication Sig Dispense Refill     Cholecalciferol (VITAMIN D) 2000 UNIT tablet Take 1 tablet by mouth daily.       doxylamine (UNISOM) 25 MG TABS tablet Take 25 mg by mouth At Bedtime       fludrocortisone (FLORINEF) 0.1 MG tablet Take 1 tablet (0.1 mg) by mouth daily 90 tablet 1     hydrocortisone (CORTEF) 10 MG tablet Take 1 tablet (10 mg) by mouth daily 10mg PO Q AM, 10mg PO q noon, 5mg PO mid-afternoon around 3 PM. Double or triple dose for 3 days for moderate illness as needed. 250 tablet 1     levothyroxine (SYNTHROID/LEVOTHROID) 112 MCG tablet Take 1 tablet (112 mcg) by mouth daily 90 tablet 1     norgestimate-ethinyl estradiol (ORTHO-CYCLEN) 0.25-35 MG-MCG tablet Take 1 tablet by mouth daily Active tablets for prevention of menses 112 tablet 3     WOMENS DAILY MULTIVITAMIN OR TABS 1 TABLET DAILY 30 0     No  facility-administered encounter medications on file as of 5/25/2022.             O:   NAD, WDWN, Alert & Oriented, Mood & Affect wnl, Vitals stable   Here today alone   BP (!) 146/86   Pulse 74   SpO2 98%    General appearance normal   Vitals stable   Alert, oriented and in no acute distress     0.4 cm light brown papule on right jaw   Stuck on papules and brown macules on trunk and ext   Red papules on trunk  Brown papules and macules with regular pigment network and borders on torso and extremities     The remainder of skin exam is normal       Eyes: Conjunctivae/lids:Normal     ENT: Lips: normal    MSK:Normal    Cardiovascular: peripheral edema none    Pulm: Breathing Normal     Neuro/Psych: Orientation:Alert and Orientedx3 ; Mood/Affect:normal     A/P:  1. R/O intradermal nevus on right jaw  TANGENTIAL BIOPSY SENT OUT:  After consent, anesthesia with LEC and prep, tangential excision performed and specimen sent out for permanent section histology.  No complications and routine wound care. Patient told to call our office in 1-2 weeks for result.      2. Seborrheic keratosis, lentigo, angioma, benign nevi   It was a pleasure speaking to Ashia Ying today.  BENIGN LESIONS DISCUSSED WITH PATIENT:  I discussed the specifics of tumor, prognosis, and genetics of benign lesions.  I explained that treatment of these lesions would be purely cosmetic and not medically neccessary.  I discussed with patient different removal options including excision, cautery and /or laser.      Nature and genetics of benign skin lesions dicussed with patient.  Signs and Symptoms of skin cancer discussed with patient.  ABCDEs of melanoma reviewed with patient.  Patient encouraged to perform monthly skin exams.  UV precautions reviewed with patient.  Risks of non-melanoma skin cancer discussed with patient   Return to clinic pending biopsy results.       Again, thank you for allowing me to participate in the care of your patient.         Sincerely,        Janelle Molina PA-C

## 2022-05-26 LAB — OSMOLALITY UR: 584 MMOL/KG (ref 100–1200)

## 2022-05-27 LAB
PATH REPORT.COMMENTS IMP SPEC: NORMAL
PATH REPORT.COMMENTS IMP SPEC: NORMAL
PATH REPORT.FINAL DX SPEC: NORMAL
PATH REPORT.GROSS SPEC: NORMAL
PATH REPORT.MICROSCOPIC SPEC OTHER STN: NORMAL
PATH REPORT.RELEVANT HX SPEC: NORMAL

## 2022-06-20 ENCOUNTER — MYC MEDICAL ADVICE (OUTPATIENT)
Dept: ENDOCRINOLOGY | Facility: CLINIC | Age: 47
End: 2022-06-20

## 2022-06-20 DIAGNOSIS — E03.9 HYPOTHYROIDISM, UNSPECIFIED TYPE: ICD-10-CM

## 2022-06-20 DIAGNOSIS — E27.1 ADDISON'S DISEASE (H): ICD-10-CM

## 2022-06-20 DIAGNOSIS — E27.1 ADDISON'S DISEASE (H): Primary | ICD-10-CM

## 2022-06-20 RX ORDER — HYDROCORTISONE 10 MG/1
10 TABLET ORAL DAILY
Qty: 90 TABLET | Refills: 0 | Status: SHIPPED | OUTPATIENT
Start: 2022-06-20 | End: 2022-06-22

## 2022-06-22 ENCOUNTER — MYC MEDICAL ADVICE (OUTPATIENT)
Dept: ENDOCRINOLOGY | Facility: CLINIC | Age: 47
End: 2022-06-22

## 2022-06-22 DIAGNOSIS — E27.1 ADDISON'S DISEASE (H): Primary | ICD-10-CM

## 2022-06-22 DIAGNOSIS — E03.9 HYPOTHYROIDISM, UNSPECIFIED TYPE: ICD-10-CM

## 2022-06-22 RX ORDER — HYDROCORTISONE 5 MG/1
TABLET ORAL
Qty: 160 TABLET | Refills: 5 | Status: SHIPPED | OUTPATIENT
Start: 2022-06-22 | End: 2023-01-05

## 2022-06-22 NOTE — TELEPHONE ENCOUNTER
Kayla message received from patient requesting an adjustment in follow-up visit given the cost of her prior appointment.  She is open to adjusting hydrocortisone as we discussed at her initial visit: Has been doing well with the medication changes we made at her initial visit.  We will therefore change hydrocortisone as follows: 10 mg in the morning, 10 mg at lunch, and 2.5 mg (HALF 5 mg tab) at 3 PM.  Dose updated .  Have asked patient to have BMP drawn 1 to 2 weeks after dose change.  We can push her follow-up further out to the latter part of this year.    Leah Moctezuma MD 6/22/2022 6:54 PM

## 2022-07-06 ENCOUNTER — LAB (OUTPATIENT)
Dept: LAB | Facility: CLINIC | Age: 47
End: 2022-07-06
Payer: COMMERCIAL

## 2022-07-06 DIAGNOSIS — E27.1 ADDISON'S DISEASE (H): ICD-10-CM

## 2022-07-06 LAB
ANION GAP SERPL CALCULATED.3IONS-SCNC: 16 MMOL/L (ref 7–15)
BUN SERPL-MCNC: 11.8 MG/DL (ref 6–20)
CALCIUM SERPL-MCNC: 9.6 MG/DL (ref 8.6–10)
CHLORIDE SERPL-SCNC: 96 MMOL/L (ref 98–107)
CREAT SERPL-MCNC: 0.73 MG/DL (ref 0.51–0.95)
DEPRECATED HCO3 PLAS-SCNC: 26 MMOL/L (ref 22–29)
GFR SERPL CREATININE-BSD FRML MDRD: >90 ML/MIN/1.73M2
GLUCOSE SERPL-MCNC: 105 MG/DL (ref 70–99)
POTASSIUM SERPL-SCNC: 4.1 MMOL/L (ref 3.4–5.3)
SODIUM SERPL-SCNC: 138 MMOL/L (ref 136–145)

## 2022-07-06 PROCEDURE — 36415 COLL VENOUS BLD VENIPUNCTURE: CPT

## 2022-07-06 PROCEDURE — 80048 BASIC METABOLIC PNL TOTAL CA: CPT

## 2022-07-22 ENCOUNTER — ANCILLARY PROCEDURE (OUTPATIENT)
Dept: MAMMOGRAPHY | Facility: CLINIC | Age: 47
End: 2022-07-22
Attending: OBSTETRICS & GYNECOLOGY
Payer: COMMERCIAL

## 2022-07-22 DIAGNOSIS — Z12.31 VISIT FOR SCREENING MAMMOGRAM: ICD-10-CM

## 2022-07-22 PROCEDURE — 77067 SCR MAMMO BI INCL CAD: CPT

## 2022-10-10 ENCOUNTER — TELEPHONE (OUTPATIENT)
Dept: ENDOCRINOLOGY | Facility: CLINIC | Age: 47
End: 2022-10-10

## 2022-10-10 DIAGNOSIS — E03.9 HYPOTHYROIDISM, UNSPECIFIED TYPE: ICD-10-CM

## 2022-10-10 DIAGNOSIS — E27.1 ADDISON'S DISEASE (H): ICD-10-CM

## 2022-10-10 NOTE — TELEPHONE ENCOUNTER
Pending Prescriptions:                       Disp   Refills    levothyroxine (SYNTHROID/LEVOTHROID) 112 *90 tab*1            Sig: Take 1 tablet (112 mcg) by mouth daily    fludrocortisone (FLORINEF) 0.1 MG tablet  90 tab*1            Sig: Take 1 tablet (0.1 mg) by mouth daily      Last filled: 7/12/22  Last seen: 4/13/22

## 2022-10-10 NOTE — LETTER
10/10/2022        RE: Ashia Ying  [address] [place] [address]        Dear ,    We have tried reaching you via telephone but have been unsuccessful. Please contact our scheduling team at [phone] to set up a follow up with labs prior, with .     If you have any questions please contact the clinic at [phone].      Sincerely,        [facility] Endocrinology Team

## 2022-10-18 RX ORDER — LEVOTHYROXINE SODIUM 112 UG/1
112 TABLET ORAL DAILY
Qty: 90 TABLET | Refills: 1 | OUTPATIENT
Start: 2022-10-18

## 2022-10-18 RX ORDER — FLUDROCORTISONE ACETATE 0.1 MG/1
0.1 TABLET ORAL DAILY
Qty: 90 TABLET | Refills: 1 | OUTPATIENT
Start: 2022-10-18

## 2022-10-20 RX ORDER — LEVOTHYROXINE SODIUM 112 UG/1
112 TABLET ORAL DAILY
Qty: 90 TABLET | Refills: 0 | Status: SHIPPED | OUTPATIENT
Start: 2022-10-20 | End: 2023-01-16

## 2022-10-20 RX ORDER — FLUDROCORTISONE ACETATE 0.1 MG/1
0.1 TABLET ORAL DAILY
Qty: 90 TABLET | Refills: 0 | Status: SHIPPED | OUTPATIENT
Start: 2022-10-20 | End: 2023-01-16

## 2022-10-20 NOTE — TELEPHONE ENCOUNTER
Patient called in to set up her appointment with Dr. Moctezuma. She would like to set up the lab appointment also, but was unsure if that needs to be now or in January when she sees Dr. Moctezuma? She will do whatever is required to refill her prescriptions. She prefers a message through [employer] as her phone blocks unknown numbers. Please follow up. Thank you.

## 2023-01-03 DIAGNOSIS — Z01.419 ENCOUNTER FOR GYNECOLOGICAL EXAMINATION WITHOUT ABNORMAL FINDING: ICD-10-CM

## 2023-01-03 RX ORDER — NORGESTIMATE AND ETHINYL ESTRADIOL 0.25-0.035
KIT ORAL
Qty: 112 TABLET | Refills: 3 | Status: SHIPPED | OUTPATIENT
Start: 2023-01-03 | End: 2023-11-17 | Stop reason: DRUGHIGH

## 2023-01-05 DIAGNOSIS — E03.9 HYPOTHYROIDISM, UNSPECIFIED TYPE: ICD-10-CM

## 2023-01-05 DIAGNOSIS — E27.1 ADDISON'S DISEASE (H): ICD-10-CM

## 2023-01-05 RX ORDER — HYDROCORTISONE 5 MG/1
TABLET ORAL
Qty: 160 TABLET | Refills: 1 | Status: SHIPPED | OUTPATIENT
Start: 2023-01-05 | End: 2023-04-12

## 2023-01-05 NOTE — TELEPHONE ENCOUNTER
Pending Prescriptions:                       Disp   Refills    hydrocortisone (CORTEF) 5 MG tablet       160 ta*5            Sig: 10 mg (2 tab) PO Q AM, 10 mg (2 tab) PO q noon,           2.5 mg (HALF tab) PO mid-afternoon around 3 PM.           Double or triple dose for 3 days for moderate           illness as needed.    Last filled: 12/2/22  Last seen: 4/13/22  Next appt: 4/12/23

## 2023-01-16 DIAGNOSIS — E03.9 HYPOTHYROIDISM, UNSPECIFIED TYPE: ICD-10-CM

## 2023-01-16 DIAGNOSIS — E27.1 ADDISON'S DISEASE (H): ICD-10-CM

## 2023-01-16 RX ORDER — FLUDROCORTISONE ACETATE 0.1 MG/1
0.1 TABLET ORAL DAILY
Qty: 90 TABLET | Refills: 0 | Status: SHIPPED | OUTPATIENT
Start: 2023-01-16 | End: 2023-04-12

## 2023-01-16 RX ORDER — LEVOTHYROXINE SODIUM 112 UG/1
112 TABLET ORAL DAILY
Qty: 90 TABLET | Refills: 0 | Status: SHIPPED | OUTPATIENT
Start: 2023-01-16 | End: 2023-04-12 | Stop reason: DRUGHIGH

## 2023-01-16 NOTE — TELEPHONE ENCOUNTER
rx refill    Fludrocortisone 0.1 mg  Levothyroxine 112 mg     Last seen: 4/13/22  Last lab: 5/22/22  Next appt: 4/12/23

## 2023-03-08 DIAGNOSIS — E27.1 ADDISON'S DISEASE (H): ICD-10-CM

## 2023-03-08 DIAGNOSIS — E03.9 HYPOTHYROIDISM, UNSPECIFIED TYPE: ICD-10-CM

## 2023-03-08 RX ORDER — HYDROCORTISONE 5 MG/1
TABLET ORAL
Qty: 160 TABLET | Refills: 1 | OUTPATIENT
Start: 2023-03-08

## 2023-04-10 ENCOUNTER — LAB (OUTPATIENT)
Dept: LAB | Facility: CLINIC | Age: 48
End: 2023-04-10
Payer: COMMERCIAL

## 2023-04-10 DIAGNOSIS — E03.9 HYPOTHYROIDISM, UNSPECIFIED TYPE: ICD-10-CM

## 2023-04-10 DIAGNOSIS — E27.1 ADDISON'S DISEASE (H): ICD-10-CM

## 2023-04-10 LAB
ANION GAP SERPL CALCULATED.3IONS-SCNC: 12 MMOL/L (ref 7–15)
BUN SERPL-MCNC: 17.7 MG/DL (ref 6–20)
CALCIUM SERPL-MCNC: 9.1 MG/DL (ref 8.6–10)
CHLORIDE SERPL-SCNC: 101 MMOL/L (ref 98–107)
CREAT SERPL-MCNC: 0.87 MG/DL (ref 0.51–0.95)
DEPRECATED HCO3 PLAS-SCNC: 26 MMOL/L (ref 22–29)
GFR SERPL CREATININE-BSD FRML MDRD: 83 ML/MIN/1.73M2
GLUCOSE SERPL-MCNC: 85 MG/DL (ref 70–99)
POTASSIUM SERPL-SCNC: 3.7 MMOL/L (ref 3.4–5.3)
SODIUM SERPL-SCNC: 139 MMOL/L (ref 136–145)
TSH SERPL DL<=0.005 MIU/L-ACNC: 3.18 UIU/ML (ref 0.3–4.2)

## 2023-04-10 PROCEDURE — 84443 ASSAY THYROID STIM HORMONE: CPT

## 2023-04-10 PROCEDURE — 80048 BASIC METABOLIC PNL TOTAL CA: CPT

## 2023-04-10 PROCEDURE — 36415 COLL VENOUS BLD VENIPUNCTURE: CPT

## 2023-04-10 PROCEDURE — 82306 VITAMIN D 25 HYDROXY: CPT | Performed by: INTERNAL MEDICINE

## 2023-04-10 PROCEDURE — 83735 ASSAY OF MAGNESIUM: CPT | Performed by: INTERNAL MEDICINE

## 2023-04-12 ENCOUNTER — OFFICE VISIT (OUTPATIENT)
Dept: ENDOCRINOLOGY | Facility: CLINIC | Age: 48
End: 2023-04-12
Payer: COMMERCIAL

## 2023-04-12 VITALS
WEIGHT: 122 LBS | BODY MASS INDEX: 20.52 KG/M2 | SYSTOLIC BLOOD PRESSURE: 124 MMHG | HEART RATE: 60 BPM | DIASTOLIC BLOOD PRESSURE: 80 MMHG

## 2023-04-12 DIAGNOSIS — E27.1 ADDISON'S DISEASE (H): Primary | ICD-10-CM

## 2023-04-12 DIAGNOSIS — E03.9 HYPOTHYROIDISM, UNSPECIFIED TYPE: ICD-10-CM

## 2023-04-12 LAB — MAGNESIUM SERPL-MCNC: 1.9 MG/DL (ref 1.7–2.3)

## 2023-04-12 PROCEDURE — 99215 OFFICE O/P EST HI 40 MIN: CPT | Performed by: INTERNAL MEDICINE

## 2023-04-12 RX ORDER — FLUDROCORTISONE ACETATE 0.1 MG/1
0.1 TABLET ORAL DAILY
Qty: 90 TABLET | Refills: 3 | Status: SHIPPED | OUTPATIENT
Start: 2023-04-12 | End: 2023-07-13

## 2023-04-12 RX ORDER — LEVOTHYROXINE SODIUM 125 UG/1
125 TABLET ORAL DAILY
Qty: 90 TABLET | Refills: 3 | Status: SHIPPED | OUTPATIENT
Start: 2023-04-12 | End: 2024-04-01

## 2023-04-12 RX ORDER — HYDROCORTISONE 5 MG/1
TABLET ORAL
Qty: 160 TABLET | Refills: 3 | Status: SHIPPED | OUTPATIENT
Start: 2023-04-12 | End: 2023-08-14

## 2023-04-12 NOTE — PATIENT INSTRUCTIONS
-Await pending add-on magnesium and vitamin D levels--I will update via [employer]  -Change levothyroxine to 125 mcg daily, continue bedtime dosing  -Continue hydrocortisone 10 mg in the morning, 10 mg at noon and 2.5 mg at 3 PM  -Continue fludrocortisone 0.1 mg daily  -Labs in 2 months  -Return for a follow-up visit in one year, contact me sooner if concerns  -We will communicate results via [employer], or if needed by phone

## 2023-04-12 NOTE — LETTER
2023         RE: Ashia iYng  [address] [place] [address]        Dear Colleague,    Thank you for referring your patient, Ashia Ying, to the [facility]. Please see a copy of my visit note below.      ENDOCRINOLOGY FOLLOW-UP        HISTORY OF PRESENT ILLNESS    Ashia Ying is seen in follow-up.    1.  Adrenal insufficiency.  After initial visit with me in 2022, we adjusted hydrocortisone dose slightly downward.    Current regimen: Hydrocortisone 10 mg every morning, 10 mg at noon, 2.5 mg at 3 PM (using 5 mg tablets).  Fludrocortisone 0.1 mg daily.    Endorses fatigue.  Still struggles with insomnia, did not notice a significant change by moving last dose of hydrocortisone to the early afternoon.  Some orthostasis when she changes positions.  Some muscle cramping.  Drinks plenty of water.    Has not had to increase hydrocortisone dosing for sick day treatment.    She has preferred not to get a medical alert bracelet: Does have a card in her wallet with diagnosis of adrenal insufficiency.    2.  Hypothyroidism.  Continues on levothyroxine 112 mcg daily.  She changed dose to bedtime as we discussed last visit.  As above, endorses some fatigue.  Bowel movements are regular.    Continues on OCP: Menstrual cycles occur regularly on placebo weeks.    Pertinent endocrine and related history:  1.  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  Patient previously followed at the [facility], transferred care after a move.  -Primary adrenal insufficiency was diagnosed in her early 20s after she presented in crisis after progressing symptoms of abdominal pain and fatigue.   2.  Hypothyroidism.  Diagnosed within about a year of diagnosis of adrenal insufficiency.   -She has a history of intermenstrual spotting and has been on OCP as prescribed by her gynecologist.  Cycles occur generally regularly after placebo week.  She is , with 1 pregnancy ending in  miscarriage.  No history of infertility/difficulty with conceiving.  Does not plan to have children in the future.    Pertinent Social History: , has 2 children.  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    PAST MEDICAL HISTORY  Past Medical History:   Diagnosis Date     Allergic rhinitis, cause unspecified      Chronic lymphocytic thyroiditis      Corticoadrenal insufficiency     Dr. Freddie AGUILAR of [place] [facility]     Extrinsic asthma, unspecified      Other chronic allergic conjunctivitis     Allergic conjunctivitis     Other forms of retinal detachment(361.89)      Unspecified hypothyroidism        MEDICATIONS  Current Outpatient Medications   Medication Sig Dispense Refill     Cholecalciferol (VITAMIN D) 2000 UNIT tablet Take 1 tablet by mouth daily.       doxylamine (UNISOM) 25 MG TABS tablet Take 25 mg by mouth At Bedtime       fludrocortisone (FLORINEF) 0.1 MG tablet Take 1 tablet (0.1 mg) by mouth daily 90 tablet 0     hydrocortisone (CORTEF) 5 MG tablet 10 mg (2 tab) PO Q AM, 10 mg (2 tab) PO q noon, 2.5 mg (HALF tab) PO mid-afternoon around 3 PM. Double or triple dose for 3 days for moderate illness as needed. 160 tablet 1     levothyroxine (SYNTHROID/LEVOTHROID) 112 MCG tablet Take 1 tablet (112 mcg) by mouth daily 90 tablet 0     OSEAS 0.25-35 MG-MCG tablet TAKE 1 TABLET BY MOUTH DAILY ACTIVE TABLETS FOR PREVENTION OF MENSES 112 tablet 3     WOMENS DAILY MULTIVITAMIN OR TABS 1 TABLET DAILY 30 0       Allergies, family, and social history were reviewed and documented as needed in EHR.     REVIEW OF SYSTEMS  A focused ROS was performed, with pertinent positives and negatives as noted in the HPI.    PHYSICAL EXAM  /80 (BP Location: Right arm, Patient Position: Sitting, Cuff Size: Adult Regular)   Pulse 60   Wt 55.3 kg (122 lb)   BMI 20.52 kg/m    Body mass index is 20.52 kg/m .  Constitutional: Vital  signs reviewed, as recorded above. Patient is alert, oriented and appears in no acute distress.  Eyes: Prominent eyes, although patient notes that this is typical for her; PER, EOMI, no stare, lid lag, or retraction; no conjunctival injection.  ENMT: OP clear with moist MM. Lips are without lesions.   Neck: Neck supple, no palpable thyromegaly.  Lymphatic: No cervical or supraclavicular LAD.  Cardiovascular: RRR, normal S1/S2, no audible murmurs, rubs or gallops; no LE edema.  Respiratory: CTAB, without wheezes, crackles or rhonchi; normal chest wall motion and respiratory effort.  MSK: No clubbing or cyanosis; normal muscle bulk and tone.  Skin: Normal skin color, temperature, turgor and texture.  Neurological: Alert and oriented times 3. No tremor.    DATA REVIEW  Each of the following laboratory and/or imaging studies were reviewed.    Component      Latest Ref Rng 4/10/2023   Sodium      136 - 145 mmol/L 139    Potassium      3.4 - 5.3 mmol/L 3.7    Chloride      98 - 107 mmol/L 101    Carbon Dioxide (CO2)      22 - 29 mmol/L 26    Anion Gap      7 - 15 mmol/L 12    Urea Nitrogen      6.0 - 20.0 mg/dL 17.7    Creatinine      0.51 - 0.95 mg/dL 0.87    Calcium      8.6 - 10.0 mg/dL 9.1    Glucose      70 - 99 mg/dL 85    GFR Estimate      >60 mL/min/1.73m2 83    TSH      0.30 - 4.20 uIU/mL 3.18        Component      Latest Ref Rng 5/25/2022 7/6/2022   Sodium      136 - 145 mmol/L 141  138    Potassium      3.5 - 5.0 mmol/L 3.8     Chloride      98 - 107 mmol/L 101     Carbon Dioxide      22 - 31 mmol/L 27     Anion Gap      7 - 15 mmol/L 13  16 (H)    Urea Nitrogen      8 - 22 mg/dL 11     Creatinine      0.51 - 0.95 mg/dL 0.80  0.73    Calcium      8.6 - 10.0 mg/dL 9.6  9.6    Glucose      70 - 125 mg/dL 75     Alkaline Phosphatase      45 - 120 U/L 54     AST      0 - 40 U/L 20     ALT      0 - 45 U/L 24     Protein Total      6.0 - 8.0 g/dL 6.8     Albumin      3.5 - 5.0 g/dL 3.5     Bilirubin Total      0.0 -  1.0 mg/dL 0.4     GFR Estimate      >60 mL/min/1.73m2 >90  >90    Potassium      3.4 - 5.3 mmol/L  4.1    Urea Nitrogen      6.0 - 20.0 mg/dL  11.8    Chloride      98 - 107 mmol/L  96 (L)    Carbon Dioxide (CO2)      22 - 29 mmol/L  26    Glucose      70 - 99 mg/dL  105 (H)    Urine Osmolality      100 - 1,200 mmol/kg 584     Adrenal Corticotropin      <47 pg/mL <10     TSH      0.30 - 5.00 uIU/mL 2.94     Osmolality      275 - 295 mmol/kg 293        Legend:  (H) High  (L) Low        ASSESSMENT  1.  Adrenal insufficiency, primary.  Endorses mild fatigue and some orthostatic symptoms.  Previsit labs indicate no hypokalemia and overall normal electrolytes.  If anything, based on BSA-guided estimate of hydrocortisone requirement, she may be getting slightly more hydrocortisone than is physiologic.  Patient has reservations about adjusting dose downward.  Would therefore continue her current regimen without changes.  Reevaluate electrolytes, CBC (previously had leukocytosis, possibly related to excess glucocorticoid), ACTH (previously suppressed) and renin in 2 months with next set of labs to ensure glucocorticoid and mineralocorticoid replacement are optimized.  Has preferred to defer IM hydrocortisone for now: She is aware that she would need to present urgently for evaluation if she has nausea/vomiting and is unable to keep hydrocortisone tablets down.  She has preferred to hold off on medical alert bracelet but does have a card in her wallet with a note of her diagnosis of adrenal insufficiency.    2.  Hypothyroidism.  Primary.  TSH is in the upper half of normal range; clinically endorses ongoing fatigue.  We can adjust levothyroxine dose slightly upward and reevaluate thyroid function tests in 2 months.  Continue bedtime administration of levothyroxine to maximize absorption.    3.  Increased thirst and urination.  Serum and urine osmolalities after overnight fast were normal range in 5/2022, not suggestive of  diabetes insipidus.    PLAN  -Await pending add-on magnesium and vitamin D levels--I will update via [employer]  -Change levothyroxine to 125 mcg daily, continue bedtime dosing  -Continue hydrocortisone 10 mg in the morning, 10 mg at noon and 2.5 mg at 3 PM  -Continue fludrocortisone 0.1 mg daily  -Labs in 2 months  -Return for a follow-up visit in one year, contact me sooner if concerns  -We will communicate results via [employer], or if needed by phone      Orders Placed This Encounter   Procedures     Magnesium     Vitamin D Deficiency     TSH with free T4 reflex     Comprehensive metabolic panel     CBC with platelets     Renin activity     Adrenal corticotropin       I spent a total of 46 minutes on the date of encounter reviewing medical records, evaluating the patient, coordinating care and documenting in the EHR, as detailed above.      Perlita Moctezuma MD   Division of Diabetes, Endocrinology and Metabolism  Department of Medicine      cc: Eveline Lopez MD             Again, thank you for allowing me to participate in the care of your patient.        Sincerely,        PERLITA Moctezuma MD

## 2023-04-12 NOTE — PROGRESS NOTES
ENDOCRINOLOGY FOLLOW-UP        HISTORY OF PRESENT ILLNESS    Ashia Ying is seen in follow-up.    1.  Adrenal insufficiency.  After initial visit with me in 2022, we adjusted hydrocortisone dose slightly downward.    Current regimen: Hydrocortisone 10 mg every morning, 10 mg at noon, 2.5 mg at 3 PM (using 5 mg tablets).  Fludrocortisone 0.1 mg daily.    Endorses fatigue.  Still struggles with insomnia, did not notice a significant change by moving last dose of hydrocortisone to the early afternoon.  Some orthostasis when she changes positions.  Some muscle cramping.  Drinks plenty of water.    Has not had to increase hydrocortisone dosing for sick day treatment.    She has preferred not to get a medical alert bracelet: Does have a card in her wallet with diagnosis of adrenal insufficiency.    2.  Hypothyroidism.  Continues on levothyroxine 112 mcg daily.  She changed dose to bedtime as we discussed last visit.  As above, endorses some fatigue.  Bowel movements are regular.    Continues on OCP: Menstrual cycles occur regularly on placebo weeks.    Pertinent endocrine and related history:  1.  Lindsay's disease.  Patient previously followed at the [facility] of endocrinology, transferred care after a move.  -Primary adrenal insufficiency was diagnosed in her early 20s after she presented in crisis after progressing symptoms of abdominal pain and fatigue.   2.  Hypothyroidism.  Diagnosed within about a year of diagnosis of adrenal insufficiency.   -She has a history of intermenstrual spotting and has been on OCP as prescribed by her gynecologist.  Cycles occur generally regularly after placebo week.  She is , with 1 pregnancy ending in miscarriage.  No history of infertility/difficulty with conceiving.  Does not plan to have children in the future.    Pertinent Social History: , has 2 children.  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    PAST MEDICAL HISTORY  Past Medical History:   Diagnosis Date     Allergic rhinitis, cause unspecified      Chronic lymphocytic thyroiditis      Corticoadrenal insufficiency     Dr. Freddie AGUILAR of [place] [facility]     Extrinsic asthma, unspecified      Other chronic allergic conjunctivitis     Allergic conjunctivitis     Other forms of retinal detachment(361.89)      Unspecified hypothyroidism        MEDICATIONS  Current Outpatient Medications   Medication Sig Dispense Refill     Cholecalciferol (VITAMIN D) 2000 UNIT tablet Take 1 tablet by mouth daily.       doxylamine (UNISOM) 25 MG TABS tablet Take 25 mg by mouth At Bedtime       fludrocortisone (FLORINEF) 0.1 MG tablet Take 1 tablet (0.1 mg) by mouth daily 90 tablet 0     hydrocortisone (CORTEF) 5 MG tablet 10 mg (2 tab) PO Q AM, 10 mg (2 tab) PO q noon, 2.5 mg (HALF tab) PO mid-afternoon around 3 PM. Double or triple dose for 3 days for moderate illness as needed. 160 tablet 1     levothyroxine (SYNTHROID/LEVOTHROID) 112 MCG tablet Take 1 tablet (112 mcg) by mouth daily 90 tablet 0     OSEAS 0.25-35 MG-MCG tablet TAKE 1 TABLET BY MOUTH DAILY ACTIVE TABLETS FOR PREVENTION OF MENSES 112 tablet 3     WOMENS DAILY MULTIVITAMIN OR TABS 1 TABLET DAILY 30 0       Allergies, family, and social history were reviewed and documented as needed in EHR.     REVIEW OF SYSTEMS  A focused ROS was performed, with pertinent positives and negatives as noted in the HPI.    PHYSICAL EXAM  /80 (BP Location: Right arm, Patient Position: Sitting, Cuff Size: Adult Regular)   Pulse 60   Wt 55.3 kg (122 lb)   BMI 20.52 kg/m    Body mass index is 20.52 kg/m .  Constitutional: Vital signs reviewed, as recorded above. Patient is alert, oriented and appears in no acute distress.  Eyes: Prominent eyes, although patient notes that this is typical for her; PER, EOMI, no stare, lid lag, or retraction; no conjunctival injection.  ENMT: OP clear  with moist MM. Lips are without lesions.   Neck: Neck supple, no palpable thyromegaly.  Lymphatic: No cervical or supraclavicular LAD.  Cardiovascular: RRR, normal S1/S2, no audible murmurs, rubs or gallops; no LE edema.  Respiratory: CTAB, without wheezes, crackles or rhonchi; normal chest wall motion and respiratory effort.  MSK: No clubbing or cyanosis; normal muscle bulk and tone.  Skin: Normal skin color, temperature, turgor and texture.  Neurological: Alert and oriented times 3. No tremor.    DATA REVIEW  Each of the following laboratory and/or imaging studies were reviewed.    Component      Latest Ref Rng 4/10/2023   Sodium      136 - 145 mmol/L 139    Potassium      3.4 - 5.3 mmol/L 3.7    Chloride      98 - 107 mmol/L 101    Carbon Dioxide (CO2)      22 - 29 mmol/L 26    Anion Gap      7 - 15 mmol/L 12    Urea Nitrogen      6.0 - 20.0 mg/dL 17.7    Creatinine      0.51 - 0.95 mg/dL 0.87    Calcium      8.6 - 10.0 mg/dL 9.1    Glucose      70 - 99 mg/dL 85    GFR Estimate      >60 mL/min/1.73m2 83    TSH      0.30 - 4.20 uIU/mL 3.18        Component      Latest Ref Rng 5/25/2022 7/6/2022   Sodium      136 - 145 mmol/L 141  138    Potassium      3.5 - 5.0 mmol/L 3.8     Chloride      98 - 107 mmol/L 101     Carbon Dioxide      22 - 31 mmol/L 27     Anion Gap      7 - 15 mmol/L 13  16 (H)    Urea Nitrogen      8 - 22 mg/dL 11     Creatinine      0.51 - 0.95 mg/dL 0.80  0.73    Calcium      8.6 - 10.0 mg/dL 9.6  9.6    Glucose      70 - 125 mg/dL 75     Alkaline Phosphatase      45 - 120 U/L 54     AST      0 - 40 U/L 20     ALT      0 - 45 U/L 24     Protein Total      6.0 - 8.0 g/dL 6.8     Albumin      3.5 - 5.0 g/dL 3.5     Bilirubin Total      0.0 - 1.0 mg/dL 0.4     GFR Estimate      >60 mL/min/1.73m2 >90  >90    Potassium      3.4 - 5.3 mmol/L  4.1    Urea Nitrogen      6.0 - 20.0 mg/dL  11.8    Chloride      98 - 107 mmol/L  96 (L)    Carbon Dioxide (CO2)      22 - 29 mmol/L  26    Glucose      70 - 99  mg/dL  105 (H)    Urine Osmolality      100 - 1,200 mmol/kg 584     Adrenal Corticotropin      <47 pg/mL <10     TSH      0.30 - 5.00 uIU/mL 2.94     Osmolality      275 - 295 mmol/kg 293        Legend:  (H) High  (L) Low        ASSESSMENT  1.  Adrenal insufficiency, primary.  Endorses mild fatigue and some orthostatic symptoms.  Previsit labs indicate no hypokalemia and overall normal electrolytes.  If anything, based on BSA-guided estimate of hydrocortisone requirement, she may be getting slightly more hydrocortisone than is physiologic.  Patient has reservations about adjusting dose downward.  Would therefore continue her current regimen without changes.  Reevaluate electrolytes, CBC (previously had leukocytosis, possibly related to excess glucocorticoid), ACTH (previously suppressed) and renin in 2 months with next set of labs to ensure glucocorticoid and mineralocorticoid replacement are optimized.  Has preferred to defer IM hydrocortisone for now: She is aware that she would need to present urgently for evaluation if she has nausea/vomiting and is unable to keep hydrocortisone tablets down.  She has preferred to hold off on medical alert bracelet but does have a card in her wallet with a note of her diagnosis of adrenal insufficiency.    2.  Hypothyroidism.  Primary.  TSH is in the upper half of normal range; clinically endorses ongoing fatigue.  We can adjust levothyroxine dose slightly upward and reevaluate thyroid function tests in 2 months.  Continue bedtime administration of levothyroxine to maximize absorption.    3.  Increased thirst and urination.  Serum and urine osmolalities after overnight fast were normal range in 5/2022, not suggestive of diabetes insipidus.    PLAN  -Await pending add-on magnesium and vitamin D levels--I will update via [facility]  -Change levothyroxine to 125 mcg daily, continue bedtime dosing  -Continue hydrocortisone 10 mg in the morning, 10 mg at noon and 2.5 mg at 3  PM  -Continue fludrocortisone 0.1 mg daily  -Labs in 2 months  -Return for a follow-up visit in one year, contact me sooner if concerns  -We will communicate results via MyChart, or if needed by phone      Orders Placed This Encounter   Procedures     Magnesium     Vitamin D Deficiency     TSH with free T4 reflex     Comprehensive metabolic panel     CBC with platelets     Renin activity     Adrenal corticotropin       I spent a total of 46 minutes on the date of encounter reviewing medical records, evaluating the patient, coordinating care and documenting in the EHR, as detailed above.      Leah Moctezuma MD   Division of Diabetes, Endocrinology and Metabolism  Department of Medicine      cc: Eveline Lopez MD

## 2023-04-13 LAB — DEPRECATED CALCIDIOL+CALCIFEROL SERPL-MC: 45 UG/L (ref 20–75)

## 2023-04-15 ENCOUNTER — HEALTH MAINTENANCE LETTER (OUTPATIENT)
Age: 48
End: 2023-04-15

## 2023-06-14 ENCOUNTER — LAB (OUTPATIENT)
Dept: LAB | Facility: CLINIC | Age: 48
End: 2023-06-14
Payer: COMMERCIAL

## 2023-06-14 DIAGNOSIS — E27.1 ADDISON'S DISEASE (H): ICD-10-CM

## 2023-06-14 DIAGNOSIS — E03.9 HYPOTHYROIDISM, UNSPECIFIED TYPE: ICD-10-CM

## 2023-06-14 LAB
ACTH PLAS-MCNC: <10 PG/ML
ALBUMIN SERPL BCG-MCNC: 4.2 G/DL (ref 3.5–5.2)
ALP SERPL-CCNC: 48 U/L (ref 35–104)
ALT SERPL W P-5'-P-CCNC: 14 U/L (ref 0–50)
ANION GAP SERPL CALCULATED.3IONS-SCNC: 14 MMOL/L (ref 7–15)
AST SERPL W P-5'-P-CCNC: 22 U/L (ref 0–45)
BILIRUB SERPL-MCNC: 0.4 MG/DL
BUN SERPL-MCNC: 10.6 MG/DL (ref 6–20)
CALCIUM SERPL-MCNC: 9.2 MG/DL (ref 8.6–10)
CHLORIDE SERPL-SCNC: 101 MMOL/L (ref 98–107)
CREAT SERPL-MCNC: 0.85 MG/DL (ref 0.51–0.95)
DEPRECATED HCO3 PLAS-SCNC: 25 MMOL/L (ref 22–29)
ERYTHROCYTE [DISTWIDTH] IN BLOOD BY AUTOMATED COUNT: 14.2 % (ref 10–15)
GFR SERPL CREATININE-BSD FRML MDRD: 86 ML/MIN/1.73M2
GLUCOSE SERPL-MCNC: 80 MG/DL (ref 70–99)
HCT VFR BLD AUTO: 39.4 % (ref 35–47)
HGB BLD-MCNC: 12.9 G/DL (ref 11.7–15.7)
MCH RBC QN AUTO: 30.6 PG (ref 26.5–33)
MCHC RBC AUTO-ENTMCNC: 32.7 G/DL (ref 31.5–36.5)
MCV RBC AUTO: 93 FL (ref 78–100)
PLATELET # BLD AUTO: 273 10E3/UL (ref 150–450)
POTASSIUM SERPL-SCNC: 3.6 MMOL/L (ref 3.4–5.3)
PROT SERPL-MCNC: 6.8 G/DL (ref 6.4–8.3)
RBC # BLD AUTO: 4.22 10E6/UL (ref 3.8–5.2)
SODIUM SERPL-SCNC: 140 MMOL/L (ref 136–145)
TSH SERPL DL<=0.005 MIU/L-ACNC: 3.25 UIU/ML (ref 0.3–4.2)
WBC # BLD AUTO: 9.3 10E3/UL (ref 4–11)

## 2023-06-14 PROCEDURE — 36415 COLL VENOUS BLD VENIPUNCTURE: CPT

## 2023-06-14 PROCEDURE — 80053 COMPREHEN METABOLIC PANEL: CPT

## 2023-06-14 PROCEDURE — 84244 ASSAY OF RENIN: CPT | Mod: 90

## 2023-06-14 PROCEDURE — 82024 ASSAY OF ACTH: CPT

## 2023-06-14 PROCEDURE — 99000 SPECIMEN HANDLING OFFICE-LAB: CPT

## 2023-06-14 PROCEDURE — 85027 COMPLETE CBC AUTOMATED: CPT

## 2023-06-14 PROCEDURE — 84443 ASSAY THYROID STIM HORMONE: CPT

## 2023-06-18 LAB — RENIN PLAS-CCNC: 7.6 NG/ML/HR

## 2023-07-09 ENCOUNTER — MYC MEDICAL ADVICE (OUTPATIENT)
Dept: ENDOCRINOLOGY | Facility: CLINIC | Age: 48
End: 2023-07-09
Payer: COMMERCIAL

## 2023-07-09 DIAGNOSIS — E27.1 ADDISON'S DISEASE (H): Primary | ICD-10-CM

## 2023-07-09 DIAGNOSIS — E03.9 HYPOTHYROIDISM, UNSPECIFIED TYPE: ICD-10-CM

## 2023-07-13 RX ORDER — FLUDROCORTISONE ACETATE 0.1 MG/1
0.15 TABLET ORAL DAILY
Qty: 135 TABLET | Refills: 0 | Status: SHIPPED | OUTPATIENT
Start: 2023-07-13 | End: 2023-10-23

## 2023-07-13 NOTE — TELEPHONE ENCOUNTER
Fludrocortisone dose adjusted: Please refer to my chart message exchange.  Leah Moctezuma MD 7/13/2023 2:16 PM    
I will SWITCH the dose or number of times a day I take the medications listed below when I get home from the hospital:  None

## 2023-07-28 ENCOUNTER — LAB (OUTPATIENT)
Dept: LAB | Facility: CLINIC | Age: 48
End: 2023-07-28
Payer: COMMERCIAL

## 2023-07-28 ENCOUNTER — ANCILLARY PROCEDURE (OUTPATIENT)
Dept: MAMMOGRAPHY | Facility: CLINIC | Age: 48
End: 2023-07-28
Attending: OBSTETRICS & GYNECOLOGY
Payer: COMMERCIAL

## 2023-07-28 DIAGNOSIS — Z12.31 SCREENING MAMMOGRAM FOR HIGH-RISK PATIENT: ICD-10-CM

## 2023-07-28 DIAGNOSIS — E27.1 ADDISON'S DISEASE (H): ICD-10-CM

## 2023-07-28 LAB
ANION GAP SERPL CALCULATED.3IONS-SCNC: 12 MMOL/L (ref 7–15)
BUN SERPL-MCNC: 12.9 MG/DL (ref 6–20)
CALCIUM SERPL-MCNC: 9.3 MG/DL (ref 8.6–10)
CHLORIDE SERPL-SCNC: 100 MMOL/L (ref 98–107)
CREAT SERPL-MCNC: 0.82 MG/DL (ref 0.51–0.95)
DEPRECATED HCO3 PLAS-SCNC: 26 MMOL/L (ref 22–29)
GFR SERPL CREATININE-BSD FRML MDRD: 89 ML/MIN/1.73M2
GLUCOSE SERPL-MCNC: 105 MG/DL (ref 70–99)
POTASSIUM SERPL-SCNC: 4.5 MMOL/L (ref 3.4–5.3)
SODIUM SERPL-SCNC: 138 MMOL/L (ref 136–145)

## 2023-07-28 PROCEDURE — 84244 ASSAY OF RENIN: CPT | Mod: 90

## 2023-07-28 PROCEDURE — 36415 COLL VENOUS BLD VENIPUNCTURE: CPT

## 2023-07-28 PROCEDURE — 77067 SCR MAMMO BI INCL CAD: CPT

## 2023-07-28 PROCEDURE — 99000 SPECIMEN HANDLING OFFICE-LAB: CPT

## 2023-07-28 PROCEDURE — 80048 BASIC METABOLIC PNL TOTAL CA: CPT

## 2023-08-02 LAB — RENIN PLAS-CCNC: 9 NG/ML/HR

## 2023-08-14 DIAGNOSIS — E03.9 HYPOTHYROIDISM, UNSPECIFIED TYPE: ICD-10-CM

## 2023-08-14 DIAGNOSIS — E27.1 ADDISON'S DISEASE (H): ICD-10-CM

## 2023-08-14 RX ORDER — HYDROCORTISONE 5 MG/1
TABLET ORAL
Qty: 160 TABLET | Refills: 1 | Status: SHIPPED | OUTPATIENT
Start: 2023-08-14 | End: 2023-10-10

## 2023-08-14 NOTE — TELEPHONE ENCOUNTER
Requested Prescriptions   Pending Prescriptions Disp Refills    hydrocortisone (CORTEF) 5 MG tablet 160 tablet 3     Sig: 10 mg (2 tab) PO Q AM, 10 mg (2 tab) PO q noon, 2.5 mg (HALF tab) PO mid-afternoon around 3 PM. Double or triple dose for 3 days for moderate illness as needed.       There is no refill protocol information for this order

## 2023-08-22 ENCOUNTER — MYC MEDICAL ADVICE (OUTPATIENT)
Dept: ENDOCRINOLOGY | Facility: CLINIC | Age: 48
End: 2023-08-22
Payer: COMMERCIAL

## 2023-08-22 DIAGNOSIS — E27.1 ADDISON'S DISEASE (H): Primary | ICD-10-CM

## 2023-09-15 ENCOUNTER — OFFICE VISIT (OUTPATIENT)
Dept: OBGYN | Facility: CLINIC | Age: 48
End: 2023-09-15
Payer: COMMERCIAL

## 2023-09-15 VITALS
OXYGEN SATURATION: 100 % | SYSTOLIC BLOOD PRESSURE: 120 MMHG | WEIGHT: 126 LBS | BODY MASS INDEX: 20.99 KG/M2 | HEIGHT: 65 IN | DIASTOLIC BLOOD PRESSURE: 71 MMHG | HEART RATE: 59 BPM

## 2023-09-15 DIAGNOSIS — Z13.0 SCREENING, ANEMIA, DEFICIENCY, IRON: ICD-10-CM

## 2023-09-15 DIAGNOSIS — N92.6 IRREGULAR MENSES: ICD-10-CM

## 2023-09-15 DIAGNOSIS — E03.9 HYPOTHYROIDISM, UNSPECIFIED TYPE: ICD-10-CM

## 2023-09-15 DIAGNOSIS — Z01.419 ENCOUNTER FOR GYNECOLOGICAL EXAMINATION WITHOUT ABNORMAL FINDING: Primary | ICD-10-CM

## 2023-09-15 DIAGNOSIS — Z23 NEED FOR TDAP VACCINATION: ICD-10-CM

## 2023-09-15 DIAGNOSIS — Z13.220 SCREENING FOR LIPID DISORDERS: ICD-10-CM

## 2023-09-15 DIAGNOSIS — Z01.84 ANTIBODY RESPONSE EXAM: ICD-10-CM

## 2023-09-15 DIAGNOSIS — Z13.1 SCREENING FOR DIABETES MELLITUS: ICD-10-CM

## 2023-09-15 LAB
ALBUMIN SERPL BCG-MCNC: 4.3 G/DL (ref 3.5–5.2)
ALP SERPL-CCNC: 50 U/L (ref 35–104)
ALT SERPL W P-5'-P-CCNC: 19 U/L (ref 0–50)
ANION GAP SERPL CALCULATED.3IONS-SCNC: 12 MMOL/L (ref 7–15)
AST SERPL W P-5'-P-CCNC: 23 U/L (ref 0–45)
BILIRUB SERPL-MCNC: 0.3 MG/DL
BUN SERPL-MCNC: 9.6 MG/DL (ref 6–20)
CALCIUM SERPL-MCNC: 9.1 MG/DL (ref 8.6–10)
CHLORIDE SERPL-SCNC: 100 MMOL/L (ref 98–107)
CHOLEST SERPL-MCNC: 185 MG/DL
CREAT SERPL-MCNC: 0.87 MG/DL (ref 0.51–0.95)
DEPRECATED HCO3 PLAS-SCNC: 28 MMOL/L (ref 22–29)
EGFRCR SERPLBLD CKD-EPI 2021: 83 ML/MIN/1.73M2
ERYTHROCYTE [DISTWIDTH] IN BLOOD BY AUTOMATED COUNT: 13.7 % (ref 10–15)
GLUCOSE SERPL-MCNC: 83 MG/DL (ref 70–99)
HBA1C MFR BLD: 5 % (ref 0–5.6)
HCT VFR BLD AUTO: 39.6 % (ref 35–47)
HDLC SERPL-MCNC: 86 MG/DL
HGB BLD-MCNC: 12.6 G/DL (ref 11.7–15.7)
LDLC SERPL CALC-MCNC: 64 MG/DL
MCH RBC QN AUTO: 29.7 PG (ref 26.5–33)
MCHC RBC AUTO-ENTMCNC: 31.8 G/DL (ref 31.5–36.5)
MCV RBC AUTO: 93 FL (ref 78–100)
NONHDLC SERPL-MCNC: 99 MG/DL
PLATELET # BLD AUTO: 289 10E3/UL (ref 150–450)
POTASSIUM SERPL-SCNC: 3.7 MMOL/L (ref 3.4–5.3)
PROT SERPL-MCNC: 6.9 G/DL (ref 6.4–8.3)
RBC # BLD AUTO: 4.24 10E6/UL (ref 3.8–5.2)
SODIUM SERPL-SCNC: 140 MMOL/L (ref 136–145)
T4 FREE SERPL-MCNC: 1.87 NG/DL (ref 0.9–1.7)
TRIGL SERPL-MCNC: 175 MG/DL
TSH SERPL DL<=0.005 MIU/L-ACNC: 1.3 UIU/ML (ref 0.3–4.2)
WBC # BLD AUTO: 10.8 10E3/UL (ref 4–11)

## 2023-09-15 PROCEDURE — 84439 ASSAY OF FREE THYROXINE: CPT | Performed by: OBSTETRICS & GYNECOLOGY

## 2023-09-15 PROCEDURE — 99396 PREV VISIT EST AGE 40-64: CPT | Mod: 25 | Performed by: OBSTETRICS & GYNECOLOGY

## 2023-09-15 PROCEDURE — 36415 COLL VENOUS BLD VENIPUNCTURE: CPT | Performed by: OBSTETRICS & GYNECOLOGY

## 2023-09-15 PROCEDURE — 80061 LIPID PANEL: CPT | Performed by: OBSTETRICS & GYNECOLOGY

## 2023-09-15 PROCEDURE — 99213 OFFICE O/P EST LOW 20 MIN: CPT | Mod: 25 | Performed by: OBSTETRICS & GYNECOLOGY

## 2023-09-15 PROCEDURE — 85027 COMPLETE CBC AUTOMATED: CPT | Performed by: OBSTETRICS & GYNECOLOGY

## 2023-09-15 PROCEDURE — 84443 ASSAY THYROID STIM HORMONE: CPT | Performed by: OBSTETRICS & GYNECOLOGY

## 2023-09-15 PROCEDURE — 86706 HEP B SURFACE ANTIBODY: CPT | Performed by: OBSTETRICS & GYNECOLOGY

## 2023-09-15 PROCEDURE — 80053 COMPREHEN METABOLIC PANEL: CPT | Performed by: OBSTETRICS & GYNECOLOGY

## 2023-09-15 PROCEDURE — 90715 TDAP VACCINE 7 YRS/> IM: CPT | Performed by: OBSTETRICS & GYNECOLOGY

## 2023-09-15 PROCEDURE — 90471 IMMUNIZATION ADMIN: CPT | Performed by: OBSTETRICS & GYNECOLOGY

## 2023-09-15 PROCEDURE — 83036 HEMOGLOBIN GLYCOSYLATED A1C: CPT | Performed by: OBSTETRICS & GYNECOLOGY

## 2023-09-15 ASSESSMENT — ANXIETY QUESTIONNAIRES
7. FEELING AFRAID AS IF SOMETHING AWFUL MIGHT HAPPEN: NOT AT ALL
1. FEELING NERVOUS, ANXIOUS, OR ON EDGE: MORE THAN HALF THE DAYS
GAD7 TOTAL SCORE: 4
3. WORRYING TOO MUCH ABOUT DIFFERENT THINGS: SEVERAL DAYS
GAD7 TOTAL SCORE: 4
5. BEING SO RESTLESS THAT IT IS HARD TO SIT STILL: NOT AT ALL
6. BECOMING EASILY ANNOYED OR IRRITABLE: NOT AT ALL
2. NOT BEING ABLE TO STOP OR CONTROL WORRYING: SEVERAL DAYS

## 2023-09-15 ASSESSMENT — PATIENT HEALTH QUESTIONNAIRE - PHQ9
5. POOR APPETITE OR OVEREATING: NOT AT ALL
SUM OF ALL RESPONSES TO PHQ QUESTIONS 1-9: 5

## 2023-09-15 NOTE — PROGRESS NOTES
Ashia is a 48 year old  female who presents for annual exam.     Menses are regular q 28-30 days and normal lasting 7 days.  Menses flow: normal.  Patient's last menstrual period was 2023.. Using oral contraceptives for contraception.  She is not currently considering pregnancy.  Besides routine health maintenance, she has no other health concerns today .  Kids are 15 and 12. Son is taller and sophomore and has permit.  Still having irregular bleeding on this pill and we keep trying different ones to see if it will go away. Frustrated with new endocrinologist (thyroid/aide's) and cost of care.  Supposed to get repeat BMP done.   GYNECOLOGIC HISTORY:  Menarche  Ashia is sexually active with 1male partner(s) and is currently in monogamous relationship.    History sexually transmitted infections:No STD history  STI testing offered?  Declined  [place] exposure: Unknown  History of abnormal Pap smear: NO - age 30-65 PAP every 5 years with negative HPV co-testing recommended  Family history of breast CA: Yes (Please explain):   Family history of uterine/ovarian CA: No    Family history of colon CA: No    HEALTH MAINTENANCE:  Cholesterol: (  Cholesterol   Date Value Ref Range Status   2022 216 (H) <200 mg/dL Final   2021 231 (H) <200 mg/dL Final     Comment:     Desirable:       <200 mg/dl   2019 207 (H) <200 mg/dL Final     Comment:     Desirable:       <200 mg/dl    History of abnormal lipids: Yes  Mammo: 23 . History of abnormal Mammo: No.  Regular Self Breast Exams: No  Calcium/Vitamin D intake: source:  dairy, dietary supplement(s) Adequate? Yes  TSH: (  TSH   Date Value Ref Range Status   2023 3.25 0.30 - 4.20 uIU/mL Final   2022 2.94 0.30 - 5.00 uIU/mL Final   2022 1.48 0.40 - 4.00 mU/L Final   2019 2.52 0.40 - 4.00 mU/L Final    )  Pap; (  Lab Results   Component Value Date    PAP NIL 2017    PAP NIL 2013    PAP NIL 2010     ")    HISTORY:  OB History    Para Term  AB Living   3 2 2 0 1 2   SAB IAB Ectopic Multiple Live Births   1 0 0 0 2      # Outcome Date GA Lbr Walt/2nd Weight Sex Delivery Anes PTL Lv   3 Term 10/01/10 39w4d  2.835 kg (6 lb 4 oz) F   N DELBERT      Birth Comments: IOL for IUGR, had prolapsed arm      Name: Ibeth      Apgar1: 9  Apgar5: 9   2 Term 08 38w5d  2.466 kg (5 lb 7 oz) M  EPI N DELBERT      Birth Comments: induced for IUGR      Name: Darek      Apgar1: 8  Apgar5: 9   1 SAB              Past Medical History:   Diagnosis Date    Allergic rhinitis, cause unspecified     Chronic lymphocytic thyroiditis     Corticoadrenal insufficiency     Dr. Freddie AGUILAR of [facility]    Extrinsic asthma, unspecified     Other chronic allergic conjunctivitis     Allergic conjunctivitis    Other forms of retinal detachment(361.89)     Unspecified hypothyroidism      Past Surgical History:   Procedure Laterality Date    addisonian crisis      COLONOSCOPY N/A 3/8/2021    Procedure: COLONOSCOPY, WITH POLYPECTOMY;  Surgeon: Natalia Bangura MD;  Location: [facility] OR     TOOTH EXTRACTION W/FORCEP      ZZC REPAIR DETACH RETINA,SCLERAL BUCKLE  2003     Family History   Problem Relation Age of Onset    Hypertension Mother     Arthritis Mother     Osteopenia Mother     Colon Polyps Mother     Cancer Father         basal cell cancer    Cerebrovascular Disease Father         CVA    Hypertension Father     Other - See Comments Father         stroke    Basal cell carcinoma Father     Colon Polyps Sister 42    Depression Sister     Asthma Sister     Asthma Sister     Cardiovascular Maternal Grandmother         \"heart troubles\"    Breast Cancer Maternal Grandmother     Osteoporosis Maternal Grandmother     Asthma Paternal Grandmother     Cerebrovascular Disease Paternal Grandmother     Prostate Cancer Paternal Grandfather     Basal cell carcinoma Paternal Uncle     Diabetes No family hx of      Social History " "    Socioeconomic History    Marital status:      Spouse name: Emmett    Number of children: 2   Occupational History     Employer: [employer]   Tobacco Use    Smoking status: Never    Smokeless tobacco: Never   Substance and Sexual Activity    Alcohol use: Yes     Comment: 1-2 per week    Drug use: No    Sexual activity: Yes     Partners: Male     Birth control/protection: OCP, Pill       Current Outpatient Medications:     Cholecalciferol (VITAMIN D) 2000 UNIT tablet, Take 1 tablet by mouth daily., Disp: , Rfl:     doxylamine (UNISOM) 25 MG TABS tablet, Take 25 mg by mouth At Bedtime, Disp: , Rfl:     fludrocortisone (FLORINEF) 0.1 MG tablet, Take 1.5 tablets (0.15 mg) by mouth daily, Disp: 135 tablet, Rfl: 0    hydrocortisone (CORTEF) 5 MG tablet, 10 mg (2 tab) PO Q AM, 10 mg (2 tab) PO q noon, 2.5 mg (HALF tab) PO mid-afternoon around 3 PM. Double or triple dose for 3 days for moderate illness as needed., Disp: 160 tablet, Rfl: 1    levothyroxine (SYNTHROID/LEVOTHROID) 125 MCG tablet, Take 1 tablet (125 mcg) by mouth daily, Disp: 90 tablet, Rfl: 3    OSEAS 0.25-35 MG-MCG tablet, TAKE 1 TABLET BY MOUTH DAILY ACTIVE TABLETS FOR PREVENTION OF MENSES, Disp: 112 tablet, Rfl: 3    WOMENS DAILY MULTIVITAMIN OR TABS, 1 TABLET DAILY, Disp: 30, Rfl: 0     Allergies   Allergen Reactions    Pcn [Penicillins]     Sulfa Antibiotics        Past medical, surgical, social and family history were reviewed and updated in EPIC.      EXAM:  /71   Pulse 59   Ht 1.638 m (5' 4.5\")   Wt 57.2 kg (126 lb)   LMP 09/04/2023   SpO2 100%   BMI 21.29 kg/m     BMI: Body mass index is 21.29 kg/m .  Constitutional: healthy, alert and no distress  Head: Normocephalic. No masses, lesions, tenderness or abnormalities  Neck: Neck supple. Trachea midline. No adenopathy. Thyroid symmetric, normal size.   Cardiovascular: RRR.   Respiratory: Negative.   Breast: Breasts reveal mild symmetric fibrocystic densities, but there are no " dominant, discrete, fixed or suspicious masses found.  Gastrointestinal: Abdomen soft, non-tender, non-distended. No masses, organomegaly.  :  Vulva:  No external lesions, normal female hair distribution, no inguinal adenopathy.    Urethra:  Midline, non-tender, well supported, no discharge  Vagina:  Moist, pink, no abnormal discharge, no lesions  Uterus:  Normal size , non-tender, freely mobile  Ovaries:  No masses appreciated, non-tender, mobile  Rectal Exam: deferred  Musculoskeletal: extremities normal  Skin: no suspicious lesions or rashes  Psychiatric: Affect appropriate, cooperative,mentation appears normal.     COUNSELING:   Reviewed preventive health counseling, as reflected in patient instructions   reports that she has never smoked. She has never used smokeless tobacco.    Body mass index is 21.29 kg/m .    FRAX Risk Assessment    ASSESSMENT:  48 year old female with satisfactory annual exam  (Z01.419) Encounter for gynecological examination without abnormal finding  (primary encounter diagnosis)  Comment: OCP  Plan: getting regular yearly mammograms done.     (E03.9) Hypothyroidism, unspecified type  Comment: sees endocrine but getting expensive  Plan: TSH, T4, free        Check labs today    (Z13.0) Screening, anemia, deficiency, iron  Plan: CBC with platelets        Check lab today    (Z13.220) Screening for lipid disorders  Comment: not fasting  Plan: Lipid panel reflex to direct LDL Fasting        Check lab today    (Z13.1) Screening for diabetes mellitus  Plan: Comprehensive metabolic panel, Hemoglobin A1c        Check labs today    (Z01.84) Antibody response exam  Comment: unsure if vaccinated  Plan: Hepatitis B Surface Antibody        Check lab today    (Z23) Need for Tdap vaccination  Comment: due  Plan: VACCINE ADMINISTRATION MNVFC, INITIAL, TDAP 7+         (ADACEL,BOOSTRIX)        Given today    (N92.6) Irregular menses  Comment: on OCP  Plan: US Pelvic Transabdominal and Transvaginal,          norethindrone-ethinyl estradiol (NECON) 0.5-35         MG-MCG tablet        Will check u/s to make sure not a polyp we are dealing with. (Since has h/o colon polyps and continues to have bleeding on different OCP's.   Has tried Kelnor, seasonique, apri and orthocyclen prior. Likes more continuous therapy so not good triphasic candidate.     Eveline Lopez MD

## 2023-09-18 LAB
HBV SURFACE AB SERPL IA-ACNC: 4.68 M[IU]/ML
HBV SURFACE AB SERPL IA-ACNC: NONREACTIVE M[IU]/ML

## 2023-10-10 DIAGNOSIS — E03.9 HYPOTHYROIDISM, UNSPECIFIED TYPE: ICD-10-CM

## 2023-10-10 DIAGNOSIS — E27.1 ADDISON'S DISEASE (H): ICD-10-CM

## 2023-10-10 RX ORDER — HYDROCORTISONE 5 MG/1
TABLET ORAL
Qty: 160 TABLET | Refills: 1 | Status: SHIPPED | OUTPATIENT
Start: 2023-10-10 | End: 2023-12-08

## 2023-10-10 NOTE — TELEPHONE ENCOUNTER
Requested Prescriptions   Pending Prescriptions Disp Refills    hydrocortisone (CORTEF) 5 MG tablet 160 tablet 1     Sig: 10 mg (2 tab) PO Q AM, 10 mg (2 tab) PO q noon, 2.5 mg (HALF tab) PO mid-afternoon around 3 PM. Double or triple dose for 3 days for moderate illness as needed.       There is no refill protocol information for this order

## 2023-10-13 ENCOUNTER — ANCILLARY PROCEDURE (OUTPATIENT)
Dept: ULTRASOUND IMAGING | Facility: CLINIC | Age: 48
End: 2023-10-13
Attending: OBSTETRICS & GYNECOLOGY
Payer: COMMERCIAL

## 2023-10-13 DIAGNOSIS — N92.6 IRREGULAR MENSES: ICD-10-CM

## 2023-10-13 PROCEDURE — 76856 US EXAM PELVIC COMPLETE: CPT | Performed by: OBSTETRICS & GYNECOLOGY

## 2023-10-13 PROCEDURE — 76830 TRANSVAGINAL US NON-OB: CPT | Performed by: OBSTETRICS & GYNECOLOGY

## 2023-10-14 ENCOUNTER — E-VISIT (OUTPATIENT)
Dept: OBGYN | Facility: CLINIC | Age: 48
End: 2023-10-14
Payer: COMMERCIAL

## 2023-10-14 DIAGNOSIS — N84.1 ENDOCERVICAL POLYP: Primary | ICD-10-CM

## 2023-10-14 PROCEDURE — 99421 OL DIG E/M SVC 5-10 MIN: CPT | Performed by: OBSTETRICS & GYNECOLOGY

## 2023-10-16 ENCOUNTER — TELEPHONE (OUTPATIENT)
Dept: OBGYN | Facility: CLINIC | Age: 48
End: 2023-10-16
Payer: COMMERCIAL

## 2023-10-17 NOTE — TELEPHONE ENCOUNTER
Type of surgery: gyn  Location of surgery: [facility] [place] [facility]  Date and time of surgery: 12/11/2023 12:45PM  Surgeon: Dr. Eveline Lopez  Pre-Op Appt Date: 11/17/2023  Post-Op Appt Date: 12/8/2023   Packet sent out: Yes  Pre-cert/Authorization completed:  Not Applicable  Date: 10/17/2023

## 2023-10-23 DIAGNOSIS — E03.9 HYPOTHYROIDISM, UNSPECIFIED TYPE: ICD-10-CM

## 2023-10-23 DIAGNOSIS — E27.1 ADDISON'S DISEASE (H): ICD-10-CM

## 2023-10-23 NOTE — TELEPHONE ENCOUNTER
Requested Prescriptions   Pending Prescriptions Disp Refills    fludrocortisone (FLORINEF) 0.1 MG tablet 135 tablet 0     Sig: Take 1.5 tablets (0.15 mg) by mouth daily       There is no refill protocol information for this order

## 2023-10-24 RX ORDER — FLUDROCORTISONE ACETATE 0.1 MG/1
0.15 TABLET ORAL DAILY
Qty: 135 TABLET | Refills: 1 | Status: SHIPPED | OUTPATIENT
Start: 2023-10-24 | End: 2024-04-19

## 2023-11-17 ENCOUNTER — OFFICE VISIT (OUTPATIENT)
Dept: FAMILY MEDICINE | Facility: CLINIC | Age: 48
End: 2023-11-17
Payer: COMMERCIAL

## 2023-11-17 VITALS
RESPIRATION RATE: 8 BRPM | TEMPERATURE: 98.2 F | OXYGEN SATURATION: 100 % | HEART RATE: 70 BPM | BODY MASS INDEX: 21.36 KG/M2 | WEIGHT: 128.2 LBS | DIASTOLIC BLOOD PRESSURE: 75 MMHG | SYSTOLIC BLOOD PRESSURE: 113 MMHG | HEIGHT: 65 IN

## 2023-11-17 DIAGNOSIS — N84.1 ENDOCERVICAL POLYP: ICD-10-CM

## 2023-11-17 DIAGNOSIS — E27.1 ADDISON'S DISEASE (H): ICD-10-CM

## 2023-11-17 DIAGNOSIS — Z01.818 PREOPERATIVE EXAMINATION: Primary | ICD-10-CM

## 2023-11-17 PROCEDURE — 99204 OFFICE O/P NEW MOD 45 MIN: CPT | Performed by: PHYSICIAN ASSISTANT

## 2023-11-17 NOTE — H&P (VIEW-ONLY)
[facility]  [address] [facility] [address]  Phone: [phone]  Fax: [phone]  Primary Provider: Eveline Lopez  Pre-op Performing Provider: LAWANDA ODONNELL      PREOPERATIVE EVALUATION:  Today's date: 11/17/2023    Ashia is a 48 year old, presenting for the following:  Pre-Op Exam        11/17/2023    12:52 PM   Additional Questions   Roomed by BRENDAN Kendrick CMA([facility])       Surgical Information:  Surgery/Procedure: HYSTEROSCOPY, WITH DILATION AND CURETTAGE OF UTERUS USING MORCELLATOR   Surgery Location: [facility]  Surgeon: Dr. Eveline Lopez  Surgery Date: 12/11/2023  Time of Surgery: 2:45pm  Where patient plans to recover: At home with family  Fax number for surgical facility: Note does not need to be faxed, will be available electronically in Epic.    Assessment & Plan     The proposed surgical procedure is considered LOW risk.    Preoperative examination  Endocervical polyp  Patient will be having surgery to remove endocervical polyp.  Patient knows where to be and when for surgery.  NPO recommendations discussed.    Cibola's disease (H)  Chronic issue, discussed case with her Endocrinologist, is supposed to take double her steroid on day of procedure.            - No identified additional risk factors other than previously addressed    Antiplatelet or Anticoagulation Medication Instructions:   - Patient is on no antiplatelet or anticoagulation medications.    Additional Medication Instructions:  Take birth control, thyroid medications and Cibola's medication day of procedure as recommended by her Endocrinologist.  Hold vitamins and supplements day of procedure.  Tylenol okay for pain, hold Aleve 7 days prior.    RECOMMENDATION:  APPROVAL GIVEN to proceed with proposed procedure, without further diagnostic evaluation.            Subjective       HPI related to upcoming procedure: patient has irregular menses, found to have  endocervical polyp, getting removed        11/14/2023     4:32 PM   Preop Questions   1. Have you ever had a heart attack or stroke? No   2. Have you ever had surgery on your heart or blood vessels, such as a stent placement, a coronary artery bypass, or surgery on an artery in your head, neck, heart, or legs? No   3. Do you have chest pain with activity? No   4. Do you have a history of  heart failure? No   5. Do you currently have a cold, bronchitis or symptoms of other infection? No   6. Do you have a cough, shortness of breath, or wheezing? No   7. Do you or anyone in your family have previous history of blood clots? YES - dad had stroke   8. Do you or does anyone in your family have a serious bleeding problem such as prolonged bleeding following surgeries or cuts? No   9. Have you ever had problems with anemia or been told to take iron pills? No   10. Have you had any abnormal blood loss such as black, tarry or bloody stools, or abnormal vaginal bleeding? YES - slight irregular bleeding   11. Have you ever had a blood transfusion? No   12. Are you willing to have a blood transfusion if it is medically needed before, during, or after your surgery? Yes   13. Have you or any of your relatives ever had problems with anesthesia? No   14. Do you have sleep apnea, excessive snoring or daytime drowsiness? No   15. Do you have any artifical heart valves or other implanted medical devices like a pacemaker, defibrillator, or continuous glucose monitor? No   16. Do you have artificial joints? No   17. Are you allergic to latex? No   18. Is there any chance that you may be pregnant? No       Health Care Directive:  Patient does not have a Health Care Directive or Living Will: Patient states has Advance Directive and will bring in a copy to clinic.    Preoperative Review of :   reviewed - no record of controlled substances prescribed.        Review of Systems  CONSTITUTIONAL: NEGATIVE for fever, chills, change in  weight  INTEGUMENTARY/SKIN: NEGATIVE for worrisome rashes, moles or lesions  EYES: NEGATIVE for vision changes or irritation  ENT/MOUTH: NEGATIVE for ear, mouth and throat problems  RESP: NEGATIVE for significant cough or SOB  CV: NEGATIVE for chest pain, palpitations or peripheral edema  GI: NEGATIVE for nausea, abdominal pain, heartburn, or change in bowel habits  : NEGATIVE for frequency, dysuria, or hematuria  MUSCULOSKELETAL: NEGATIVE for significant arthralgias or myalgia  NEURO: NEGATIVE for weakness, dizziness or paresthesias  ENDOCRINE: NEGATIVE for temperature intolerance, skin/hair changes  HEME: NEGATIVE for bleeding problems  PSYCHIATRIC: NEGATIVE for changes in mood or affect    Patient Active Problem List    Diagnosis Date Noted    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 Elizabeth Crow - [facility]".    IN HOSPITAL NEEDS hydrocortisone 100 mg IV q8 hours until delivery, will then take regular doses after delivery      Hypothyroidism 10/17/2005     Priority: Medium     Problem list name updated by automated process. Provider to review      Other chronic allergic conjunctivitis 10/17/2005     Priority: Medium     Allergic conjunctivitis      Other forms of retinal detachment(361.89) 10/17/2005     Priority: Medium    Other acne 10/17/2005     Priority: Medium      Past Medical History:   Diagnosis Date    Allergic rhinitis, cause unspecified     Chronic lymphocytic thyroiditis     Corticoadrenal insufficiency     Dr. Freddie AGUILAR of [place] [facility]    Extrinsic asthma, unspecified     Other chronic allergic conjunctivitis     Allergic conjunctivitis    Other forms of retinal detachment(361.89)     Unspecified hypothyroidism      Past Surgical History:   Procedure Laterality Date    addisonian crisis  2002  "   COLONOSCOPY N/A 3/8/2021    Procedure: COLONOSCOPY, WITH POLYPECTOMY;  Surgeon: Natalia Bangura MD;  Location: [facility] OR     TOOTH EXTRACTION W/FORCEP      ZZC REPAIR DETACH RETINA,SCLERAL BUCKLE  2003     Current Outpatient Medications   Medication Sig Dispense Refill    Cholecalciferol (VITAMIN D) 2000 UNIT tablet Take 1 tablet by mouth daily.      doxylamine (UNISOM) 25 MG TABS tablet Take 25 mg by mouth At Bedtime      fludrocortisone (FLORINEF) 0.1 MG tablet Take 1.5 tablets (0.15 mg) by mouth daily 135 tablet 1    hydrocortisone (CORTEF) 5 MG tablet 10 mg (2 tab) PO Q AM, 10 mg (2 tab) PO q noon, 2.5 mg (HALF tab) PO mid-afternoon around 3 PM. Double or triple dose for 3 days for moderate illness as needed. 160 tablet 1    levothyroxine (SYNTHROID/LEVOTHROID) 125 MCG tablet Take 1 tablet (125 mcg) by mouth daily 90 tablet 3    norethindrone-ethinyl estradiol (NECON) 0.5-35 MG-MCG tablet Take 1 tablet by mouth daily Active tablets only for prevention of menses 112 tablet 4    WOMENS DAILY MULTIVITAMIN OR TABS 1 TABLET DAILY 30 0    OSEAS 0.25-35 MG-MCG tablet TAKE 1 TABLET BY MOUTH DAILY ACTIVE TABLETS FOR PREVENTION OF MENSES 112 tablet 3       Allergies   Allergen Reactions    Pcn [Penicillins]     Sulfa Antibiotics         Social History     Tobacco Use    Smoking status: Never     Passive exposure: Never    Smokeless tobacco: Never   Substance Use Topics    Alcohol use: Yes     Comment: 1-2 per week     History   Drug Use No         Objective     /75   Pulse 70   Temp 98.2  F (36.8  C) (Oral)   Resp (!) 8   Ht 1.638 m (5' 4.5\")   Wt 58.2 kg (128 lb 3.2 oz)   LMP 11/03/2023 (Approximate)   SpO2 100%   BMI 21.67 kg/m      Physical Exam    GENERAL APPEARANCE: healthy, alert and no distress     EYES: EOMI, PERRL     HENT: ear canals and TM's normal and nose and mouth without ulcers or lesions     NECK: no adenopathy, no asymmetry, masses, or scars and thyroid normal to palpation     RESP: lungs " clear to auscultation - no rales, rhonchi or wheezes     CV: regular rates and rhythm, normal S1 S2, no S3 or S4 and no murmur, click or rub     ABDOMEN:  soft, nontender, no HSM or masses and bowel sounds normal     MS: extremities normal- no gross deformities noted, no evidence of inflammation in joints, FROM in all extremities.     SKIN: no suspicious lesions or rashes     NEURO: Normal strength and tone, sensory exam grossly normal, mentation intact and speech normal     PSYCH: mentation appears normal. and affect normal/bright     LYMPHATICS: No cervical adenopathy    Recent Labs   Lab Test 09/15/23  0845 07/28/23  1520 06/14/23  0709 05/25/22  0719 02/04/22  0837   HGB 12.6  --  12.9  --  14.4     --  273  --  312    138 140   < > 136   POTASSIUM 3.7 4.5 3.6   < > 3.1*   CR 0.87 0.82 0.85   < > 0.83   A1C 5.0  --   --   --  5.1    < > = values in this interval not displayed.        Diagnostics:  No labs were ordered during this visit.   No EKG required for low risk surgery (cataract, skin procedure, breast biopsy, etc).    Revised Cardiac Risk Index (RCRI):  The patient has the following serious cardiovascular risks for perioperative complications:   - No serious cardiac risks = 0 points     RCRI Interpretation: 0 points: Class I (very low risk - 0.4% complication rate)         Signed Electronically by: Faye Torres PA-C  Copy of this evaluation report is provided to requesting physician.

## 2023-11-17 NOTE — PROGRESS NOTES
[facility]  [address] [facility] [address]  Phone: [phone]  Fax: [phone]  Primary Provider: Eveline Lopez  Pre-op Performing Provider: LAWANDA ODONNELL      PREOPERATIVE EVALUATION:  Today's date: 11/17/2023    Ashia is a 48 year old, presenting for the following:  Pre-Op Exam        11/17/2023    12:52 PM   Additional Questions   Roomed by BRENDAN Kendrick CMA([facility])       Surgical Information:  Surgery/Procedure: HYSTEROSCOPY, WITH DILATION AND CURETTAGE OF UTERUS USING MORCELLATOR   Surgery Location: [facility]  Surgeon: Dr. Eveline Lopez  Surgery Date: 12/11/2023  Time of Surgery: 2:45pm  Where patient plans to recover: At home with family  Fax number for surgical facility: Note does not need to be faxed, will be available electronically in Epic.    Assessment & Plan     The proposed surgical procedure is considered LOW risk.    Preoperative examination  Endocervical polyp  Patient will be having surgery to remove endocervical polyp.  Patient knows where to be and when for surgery.  NPO recommendations discussed.    Jewell's disease (H)  Chronic issue, discussed case with her Endocrinologist, is supposed to take double her steroid on day of procedure.            - No identified additional risk factors other than previously addressed    Antiplatelet or Anticoagulation Medication Instructions:   - Patient is on no antiplatelet or anticoagulation medications.    Additional Medication Instructions:  Take birth control, thyroid medications and Jewell's medication day of procedure as recommended by her Endocrinologist.  Hold vitamins and supplements day of procedure.  Tylenol okay for pain, hold Aleve 7 days prior.    RECOMMENDATION:  APPROVAL GIVEN to proceed with proposed procedure, without further diagnostic evaluation.            Subjective       HPI related to upcoming procedure: patient has irregular menses, found to have  endocervical polyp, getting removed        11/14/2023     4:32 PM   Preop Questions   1. Have you ever had a heart attack or stroke? No   2. Have you ever had surgery on your heart or blood vessels, such as a stent placement, a coronary artery bypass, or surgery on an artery in your head, neck, heart, or legs? No   3. Do you have chest pain with activity? No   4. Do you have a history of  heart failure? No   5. Do you currently have a cold, bronchitis or symptoms of other infection? No   6. Do you have a cough, shortness of breath, or wheezing? No   7. Do you or anyone in your family have previous history of blood clots? YES - dad had stroke   8. Do you or does anyone in your family have a serious bleeding problem such as prolonged bleeding following surgeries or cuts? No   9. Have you ever had problems with anemia or been told to take iron pills? No   10. Have you had any abnormal blood loss such as black, tarry or bloody stools, or abnormal vaginal bleeding? YES - slight irregular bleeding   11. Have you ever had a blood transfusion? No   12. Are you willing to have a blood transfusion if it is medically needed before, during, or after your surgery? Yes   13. Have you or any of your relatives ever had problems with anesthesia? No   14. Do you have sleep apnea, excessive snoring or daytime drowsiness? No   15. Do you have any artifical heart valves or other implanted medical devices like a pacemaker, defibrillator, or continuous glucose monitor? No   16. Do you have artificial joints? No   17. Are you allergic to latex? No   18. Is there any chance that you may be pregnant? No       Health Care Directive:  Patient does not have a Health Care Directive or Living Will: Patient states has Advance Directive and will bring in a copy to clinic.    Preoperative Review of :   reviewed - no record of controlled substances prescribed.        Review of Systems  CONSTITUTIONAL: NEGATIVE for fever, chills, change in  weight  INTEGUMENTARY/SKIN: NEGATIVE for worrisome rashes, moles or lesions  EYES: NEGATIVE for vision changes or irritation  ENT/MOUTH: NEGATIVE for ear, mouth and throat problems  RESP: NEGATIVE for significant cough or SOB  CV: NEGATIVE for chest pain, palpitations or peripheral edema  GI: NEGATIVE for nausea, abdominal pain, heartburn, or change in bowel habits  : NEGATIVE for frequency, dysuria, or hematuria  MUSCULOSKELETAL: NEGATIVE for significant arthralgias or myalgia  NEURO: NEGATIVE for weakness, dizziness or paresthesias  ENDOCRINE: NEGATIVE for temperature intolerance, skin/hair changes  HEME: NEGATIVE for bleeding problems  PSYCHIATRIC: NEGATIVE for changes in mood or affect    Patient Active Problem List    Diagnosis Date Noted    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 Elizabeth Crow - [facility].    IN HOSPITAL NEEDS hydrocortisone 100 mg IV q8 hours until delivery, will then take regular doses after delivery      Hypothyroidism 10/17/2005     Priority: Medium     Problem list name updated by automated process. Provider to review      Other chronic allergic conjunctivitis 10/17/2005     Priority: Medium     Allergic conjunctivitis      Other forms of retinal detachment(361.89) 10/17/2005     Priority: Medium    Other acne 10/17/2005     Priority: Medium      Past Medical History:   Diagnosis Date    Allergic rhinitis, cause unspecified     Chronic lymphocytic thyroiditis     Corticoadrenal insufficiency     Dr. Freddie AGUILAR of [place] [facility]    Extrinsic asthma, unspecified     Other chronic allergic conjunctivitis     Allergic conjunctivitis    Other forms of retinal detachment(361.89)     Unspecified hypothyroidism      Past Surgical History:   Procedure Laterality Date    addisonian crisis  2002  "   COLONOSCOPY N/A 3/8/2021    Procedure: COLONOSCOPY, WITH POLYPECTOMY;  Surgeon: Natalia Bangura MD;  Location: [facility] OR     TOOTH EXTRACTION W/FORCEP      ZZC REPAIR DETACH RETINA,SCLERAL BUCKLE  2003     Current Outpatient Medications   Medication Sig Dispense Refill    Cholecalciferol (VITAMIN D) 2000 UNIT tablet Take 1 tablet by mouth daily.      doxylamine (UNISOM) 25 MG TABS tablet Take 25 mg by mouth At Bedtime      fludrocortisone (FLORINEF) 0.1 MG tablet Take 1.5 tablets (0.15 mg) by mouth daily 135 tablet 1    hydrocortisone (CORTEF) 5 MG tablet 10 mg (2 tab) PO Q AM, 10 mg (2 tab) PO q noon, 2.5 mg (HALF tab) PO mid-afternoon around 3 PM. Double or triple dose for 3 days for moderate illness as needed. 160 tablet 1    levothyroxine (SYNTHROID/LEVOTHROID) 125 MCG tablet Take 1 tablet (125 mcg) by mouth daily 90 tablet 3    norethindrone-ethinyl estradiol (NECON) 0.5-35 MG-MCG tablet Take 1 tablet by mouth daily Active tablets only for prevention of menses 112 tablet 4    WOMENS DAILY MULTIVITAMIN OR TABS 1 TABLET DAILY 30 0    OSEAS 0.25-35 MG-MCG tablet TAKE 1 TABLET BY MOUTH DAILY ACTIVE TABLETS FOR PREVENTION OF MENSES 112 tablet 3       Allergies   Allergen Reactions    Pcn [Penicillins]     Sulfa Antibiotics         Social History     Tobacco Use    Smoking status: Never     Passive exposure: Never    Smokeless tobacco: Never   Substance Use Topics    Alcohol use: Yes     Comment: 1-2 per week     History   Drug Use No         Objective     /75   Pulse 70   Temp 98.2  F (36.8  C) (Oral)   Resp (!) 8   Ht 1.638 m (5' 4.5\")   Wt 58.2 kg (128 lb 3.2 oz)   LMP 11/03/2023 (Approximate)   SpO2 100%   BMI 21.67 kg/m      Physical Exam    GENERAL APPEARANCE: healthy, alert and no distress     EYES: EOMI, PERRL     HENT: ear canals and TM's normal and nose and mouth without ulcers or lesions     NECK: no adenopathy, no asymmetry, masses, or scars and thyroid normal to palpation     RESP: lungs " clear to auscultation - no rales, rhonchi or wheezes     CV: regular rates and rhythm, normal S1 S2, no S3 or S4 and no murmur, click or rub     ABDOMEN:  soft, nontender, no HSM or masses and bowel sounds normal     MS: extremities normal- no gross deformities noted, no evidence of inflammation in joints, FROM in all extremities.     SKIN: no suspicious lesions or rashes     NEURO: Normal strength and tone, sensory exam grossly normal, mentation intact and speech normal     PSYCH: mentation appears normal. and affect normal/bright     LYMPHATICS: No cervical adenopathy    Recent Labs   Lab Test 09/15/23  0845 07/28/23  1520 06/14/23  0709 05/25/22  0719 02/04/22  0837   HGB 12.6  --  12.9  --  14.4     --  273  --  312    138 140   < > 136   POTASSIUM 3.7 4.5 3.6   < > 3.1*   CR 0.87 0.82 0.85   < > 0.83   A1C 5.0  --   --   --  5.1    < > = values in this interval not displayed.        Diagnostics:  No labs were ordered during this visit.   No EKG required for low risk surgery (cataract, skin procedure, breast biopsy, etc).    Revised Cardiac Risk Index (RCRI):  The patient has the following serious cardiovascular risks for perioperative complications:   - No serious cardiac risks = 0 points     RCRI Interpretation: 0 points: Class I (very low risk - 0.4% complication rate)         Signed Electronically by: Faye Torres PA-C  Copy of this evaluation report is provided to requesting physician.

## 2023-12-04 DIAGNOSIS — Z01.818 PRE-OP EXAM: Primary | ICD-10-CM

## 2023-12-04 DIAGNOSIS — N84.1 ENDOCERVICAL POLYP: ICD-10-CM

## 2023-12-07 LAB
ABO/RH(D): NORMAL
ANTIBODY SCREEN: NEGATIVE
SPECIMEN EXPIRATION DATE: NORMAL

## 2023-12-08 ENCOUNTER — LAB (OUTPATIENT)
Dept: LAB | Facility: CLINIC | Age: 48
End: 2023-12-08
Payer: COMMERCIAL

## 2023-12-08 DIAGNOSIS — E27.1 ADDISON'S DISEASE (H): ICD-10-CM

## 2023-12-08 DIAGNOSIS — N84.1 ENDOCERVICAL POLYP: ICD-10-CM

## 2023-12-08 DIAGNOSIS — E03.9 HYPOTHYROIDISM, UNSPECIFIED TYPE: ICD-10-CM

## 2023-12-08 DIAGNOSIS — Z01.818 PRE-OP EXAM: ICD-10-CM

## 2023-12-08 LAB
ANION GAP SERPL CALCULATED.3IONS-SCNC: 12 MMOL/L (ref 7–15)
BUN SERPL-MCNC: 10.9 MG/DL (ref 6–20)
CALCIUM SERPL-MCNC: 9.5 MG/DL (ref 8.6–10)
CHLORIDE SERPL-SCNC: 101 MMOL/L (ref 98–107)
CREAT SERPL-MCNC: 0.83 MG/DL (ref 0.51–0.95)
DEPRECATED HCO3 PLAS-SCNC: 25 MMOL/L (ref 22–29)
EGFRCR SERPLBLD CKD-EPI 2021: 88 ML/MIN/1.73M2
ERYTHROCYTE [DISTWIDTH] IN BLOOD BY AUTOMATED COUNT: 14 % (ref 10–15)
GLUCOSE SERPL-MCNC: 101 MG/DL (ref 70–99)
HCT VFR BLD AUTO: 41.2 % (ref 35–47)
HGB BLD-MCNC: 13.3 G/DL (ref 11.7–15.7)
MCH RBC QN AUTO: 30 PG (ref 26.5–33)
MCHC RBC AUTO-ENTMCNC: 32.3 G/DL (ref 31.5–36.5)
MCV RBC AUTO: 93 FL (ref 78–100)
PLATELET # BLD AUTO: 267 10E3/UL (ref 150–450)
POTASSIUM SERPL-SCNC: 4 MMOL/L (ref 3.4–5.3)
RBC # BLD AUTO: 4.43 10E6/UL (ref 3.8–5.2)
SODIUM SERPL-SCNC: 138 MMOL/L (ref 135–145)
TSH SERPL DL<=0.005 MIU/L-ACNC: 1.03 UIU/ML (ref 0.3–4.2)
WBC # BLD AUTO: 9.9 10E3/UL (ref 4–11)

## 2023-12-08 PROCEDURE — 99000 SPECIMEN HANDLING OFFICE-LAB: CPT

## 2023-12-08 PROCEDURE — 86850 RBC ANTIBODY SCREEN: CPT

## 2023-12-08 PROCEDURE — 84439 ASSAY OF FREE THYROXINE: CPT | Mod: 90

## 2023-12-08 PROCEDURE — 80048 BASIC METABOLIC PNL TOTAL CA: CPT

## 2023-12-08 PROCEDURE — 86900 BLOOD TYPING SEROLOGIC ABO: CPT

## 2023-12-08 PROCEDURE — 84443 ASSAY THYROID STIM HORMONE: CPT

## 2023-12-08 PROCEDURE — 36415 COLL VENOUS BLD VENIPUNCTURE: CPT

## 2023-12-08 PROCEDURE — 85027 COMPLETE CBC AUTOMATED: CPT

## 2023-12-08 PROCEDURE — 87635 SARS-COV-2 COVID-19 AMP PRB: CPT

## 2023-12-08 PROCEDURE — 86901 BLOOD TYPING SEROLOGIC RH(D): CPT

## 2023-12-08 RX ORDER — HYDROCORTISONE 5 MG/1
TABLET ORAL
Qty: 160 TABLET | Refills: 1 | Status: SHIPPED | OUTPATIENT
Start: 2023-12-08 | End: 2024-02-05

## 2023-12-09 LAB — SARS-COV-2 RNA RESP QL NAA+PROBE: NEGATIVE

## 2023-12-10 ENCOUNTER — ANESTHESIA EVENT (OUTPATIENT)
Dept: SURGERY | Facility: CLINIC | Age: 48
End: 2023-12-10
Payer: COMMERCIAL

## 2023-12-11 ENCOUNTER — HOSPITAL ENCOUNTER (OUTPATIENT)
Facility: CLINIC | Age: 48
Discharge: HOME | End: 2023-12-11
Attending: OBSTETRICS & GYNECOLOGY | Admitting: OBSTETRICS & GYNECOLOGY
Payer: COMMERCIAL

## 2023-12-11 ENCOUNTER — ANESTHESIA (OUTPATIENT)
Dept: SURGERY | Facility: CLINIC | Age: 48
End: 2023-12-11
Payer: COMMERCIAL

## 2023-12-11 VITALS
DIASTOLIC BLOOD PRESSURE: 68 MMHG | BODY MASS INDEX: 21.74 KG/M2 | TEMPERATURE: 97.5 F | SYSTOLIC BLOOD PRESSURE: 114 MMHG | OXYGEN SATURATION: 99 % | RESPIRATION RATE: 16 BRPM | HEIGHT: 65 IN | HEART RATE: 65 BPM | WEIGHT: 130.51 LBS

## 2023-12-11 DIAGNOSIS — E27.1 ADDISON'S DISEASE (H): Primary | ICD-10-CM

## 2023-12-11 DIAGNOSIS — E03.9 HYPOTHYROIDISM, UNSPECIFIED TYPE: ICD-10-CM

## 2023-12-11 LAB — HCG UR QL: NEGATIVE

## 2023-12-11 PROCEDURE — 272N000001 HC OR GENERAL SUPPLY STERILE: Performed by: OBSTETRICS & GYNECOLOGY

## 2023-12-11 PROCEDURE — 999N000141 HC STATISTIC PRE-PROCEDURE NURSING ASSESSMENT: Performed by: OBSTETRICS & GYNECOLOGY

## 2023-12-11 PROCEDURE — 250N000013 HC RX MED GY IP 250 OP 250 PS 637: Performed by: OBSTETRICS & GYNECOLOGY

## 2023-12-11 PROCEDURE — 258N000003 HC RX IP 258 OP 636: Performed by: NURSE ANESTHETIST, CERTIFIED REGISTERED

## 2023-12-11 PROCEDURE — 58558 HYSTEROSCOPY BIOPSY: CPT | Performed by: OBSTETRICS & GYNECOLOGY

## 2023-12-11 PROCEDURE — 81025 URINE PREGNANCY TEST: CPT | Performed by: OBSTETRICS & GYNECOLOGY

## 2023-12-11 PROCEDURE — 370N000017 HC ANESTHESIA TECHNICAL FEE, PER MIN: Performed by: OBSTETRICS & GYNECOLOGY

## 2023-12-11 PROCEDURE — 250N000011 HC RX IP 250 OP 636: Performed by: NURSE ANESTHETIST, CERTIFIED REGISTERED

## 2023-12-11 PROCEDURE — 250N000009 HC RX 250: Performed by: OBSTETRICS & GYNECOLOGY

## 2023-12-11 PROCEDURE — 88305 TISSUE EXAM BY PATHOLOGIST: CPT | Mod: TC | Performed by: OBSTETRICS & GYNECOLOGY

## 2023-12-11 PROCEDURE — 88305 TISSUE EXAM BY PATHOLOGIST: CPT | Mod: 26 | Performed by: PATHOLOGY

## 2023-12-11 PROCEDURE — 710N000012 HC RECOVERY PHASE 2, PER MINUTE: Performed by: OBSTETRICS & GYNECOLOGY

## 2023-12-11 PROCEDURE — 360N000076 HC SURGERY LEVEL 3, PER MIN: Performed by: OBSTETRICS & GYNECOLOGY

## 2023-12-11 RX ORDER — FENTANYL CITRATE 50 UG/ML
INJECTION, SOLUTION INTRAMUSCULAR; INTRAVENOUS PRN
Status: DISCONTINUED | OUTPATIENT
Start: 2023-12-11 | End: 2023-12-11

## 2023-12-11 RX ORDER — PROPOFOL 10 MG/ML
INJECTION, EMULSION INTRAVENOUS CONTINUOUS PRN
Status: DISCONTINUED | OUTPATIENT
Start: 2023-12-11 | End: 2023-12-11

## 2023-12-11 RX ORDER — ONDANSETRON 2 MG/ML
4 INJECTION INTRAMUSCULAR; INTRAVENOUS EVERY 30 MIN PRN
Status: DISCONTINUED | OUTPATIENT
Start: 2023-12-11 | End: 2023-12-15 | Stop reason: HOSPADM

## 2023-12-11 RX ORDER — HYDROMORPHONE HYDROCHLORIDE 1 MG/ML
0.4 INJECTION, SOLUTION INTRAMUSCULAR; INTRAVENOUS; SUBCUTANEOUS EVERY 5 MIN PRN
Status: DISCONTINUED | OUTPATIENT
Start: 2023-12-11 | End: 2023-12-15 | Stop reason: HOSPADM

## 2023-12-11 RX ORDER — OXYCODONE HYDROCHLORIDE 5 MG/1
5 TABLET ORAL
Status: DISCONTINUED | OUTPATIENT
Start: 2023-12-11 | End: 2023-12-15 | Stop reason: HOSPADM

## 2023-12-11 RX ORDER — HYDROMORPHONE HYDROCHLORIDE 1 MG/ML
0.2 INJECTION, SOLUTION INTRAMUSCULAR; INTRAVENOUS; SUBCUTANEOUS EVERY 5 MIN PRN
Status: DISCONTINUED | OUTPATIENT
Start: 2023-12-11 | End: 2023-12-15 | Stop reason: HOSPADM

## 2023-12-11 RX ORDER — SODIUM CHLORIDE, SODIUM LACTATE, POTASSIUM CHLORIDE, CALCIUM CHLORIDE 600; 310; 30; 20 MG/100ML; MG/100ML; MG/100ML; MG/100ML
INJECTION, SOLUTION INTRAVENOUS CONTINUOUS PRN
Status: DISCONTINUED | OUTPATIENT
Start: 2023-12-11 | End: 2023-12-11

## 2023-12-11 RX ORDER — OXYCODONE HYDROCHLORIDE 10 MG/1
10 TABLET ORAL
Status: DISCONTINUED | OUTPATIENT
Start: 2023-12-11 | End: 2023-12-15 | Stop reason: HOSPADM

## 2023-12-11 RX ORDER — ONDANSETRON 2 MG/ML
INJECTION INTRAMUSCULAR; INTRAVENOUS PRN
Status: DISCONTINUED | OUTPATIENT
Start: 2023-12-11 | End: 2023-12-11

## 2023-12-11 RX ORDER — FENTANYL CITRATE 50 UG/ML
50 INJECTION, SOLUTION INTRAMUSCULAR; INTRAVENOUS EVERY 5 MIN PRN
Status: DISCONTINUED | OUTPATIENT
Start: 2023-12-11 | End: 2023-12-15 | Stop reason: HOSPADM

## 2023-12-11 RX ORDER — ONDANSETRON 4 MG/1
4 TABLET, ORALLY DISINTEGRATING ORAL EVERY 30 MIN PRN
Status: DISCONTINUED | OUTPATIENT
Start: 2023-12-11 | End: 2023-12-15 | Stop reason: HOSPADM

## 2023-12-11 RX ORDER — ACETAMINOPHEN 325 MG/1
975 TABLET ORAL ONCE
Status: DISCONTINUED | OUTPATIENT
Start: 2023-12-11 | End: 2023-12-15 | Stop reason: HOSPADM

## 2023-12-11 RX ORDER — IBUPROFEN 800 MG/1
800 TABLET, FILM COATED ORAL ONCE
Status: DISCONTINUED | OUTPATIENT
Start: 2023-12-11 | End: 2023-12-15 | Stop reason: HOSPADM

## 2023-12-11 RX ORDER — ACETAMINOPHEN 325 MG/1
975 TABLET ORAL ONCE
Status: COMPLETED | OUTPATIENT
Start: 2023-12-11 | End: 2023-12-11

## 2023-12-11 RX ORDER — SODIUM CHLORIDE, SODIUM LACTATE, POTASSIUM CHLORIDE, CALCIUM CHLORIDE 600; 310; 30; 20 MG/100ML; MG/100ML; MG/100ML; MG/100ML
INJECTION, SOLUTION INTRAVENOUS CONTINUOUS
Status: DISCONTINUED | OUTPATIENT
Start: 2023-12-11 | End: 2023-12-15 | Stop reason: HOSPADM

## 2023-12-11 RX ORDER — FENTANYL CITRATE 50 UG/ML
25 INJECTION, SOLUTION INTRAMUSCULAR; INTRAVENOUS EVERY 5 MIN PRN
Status: DISCONTINUED | OUTPATIENT
Start: 2023-12-11 | End: 2023-12-15 | Stop reason: HOSPADM

## 2023-12-11 RX ORDER — PROPOFOL 10 MG/ML
INJECTION, EMULSION INTRAVENOUS PRN
Status: DISCONTINUED | OUTPATIENT
Start: 2023-12-11 | End: 2023-12-11

## 2023-12-11 RX ORDER — KETOROLAC TROMETHAMINE 30 MG/ML
INJECTION, SOLUTION INTRAMUSCULAR; INTRAVENOUS PRN
Status: DISCONTINUED | OUTPATIENT
Start: 2023-12-11 | End: 2023-12-11

## 2023-12-11 RX ADMIN — MIDAZOLAM 1 MG: 1 INJECTION INTRAMUSCULAR; INTRAVENOUS at 14:15

## 2023-12-11 RX ADMIN — ACETAMINOPHEN 975 MG: 325 TABLET, FILM COATED ORAL at 13:10

## 2023-12-11 RX ADMIN — PROPOFOL 100 MCG/KG/MIN: 10 INJECTION, EMULSION INTRAVENOUS at 14:06

## 2023-12-11 RX ADMIN — MIDAZOLAM 2 MG: 1 INJECTION INTRAMUSCULAR; INTRAVENOUS at 13:58

## 2023-12-11 RX ADMIN — PROPOFOL 30 MG: 10 INJECTION, EMULSION INTRAVENOUS at 14:06

## 2023-12-11 RX ADMIN — FENTANYL CITRATE 50 MCG: 50 INJECTION INTRAMUSCULAR; INTRAVENOUS at 14:06

## 2023-12-11 RX ADMIN — ONDANSETRON 4 MG: 2 INJECTION INTRAMUSCULAR; INTRAVENOUS at 14:09

## 2023-12-11 RX ADMIN — KETOROLAC TROMETHAMINE 30 MG: 30 INJECTION, SOLUTION INTRAMUSCULAR at 14:11

## 2023-12-11 RX ADMIN — SODIUM CHLORIDE, POTASSIUM CHLORIDE, SODIUM LACTATE AND CALCIUM CHLORIDE: 600; 310; 30; 20 INJECTION, SOLUTION INTRAVENOUS at 13:58

## 2023-12-11 ASSESSMENT — ACTIVITIES OF DAILY LIVING (ADL)
ADLS_ACUITY_SCORE: 35

## 2023-12-11 NOTE — DISCHARGE INSTRUCTIONS

## 2023-12-11 NOTE — ANESTHESIA PREPROCEDURE EVALUATION
Anesthesia Pre-Procedure Evaluation    Patient: Ashia Ying   MRN: [id number] : 1975        Procedure : Procedure(s):  HYSTEROSCOPY, WITH DILATION AND CURETTAGE OF UTERUS USING MORCELLATOR          Past Medical History:   Diagnosis Date     Allergic rhinitis, cause unspecified      Chronic lymphocytic thyroiditis      Corticoadrenal insufficiency     Dr. Freddie AGUILAR of [place] [facility]     Extrinsic asthma, unspecified      Other chronic allergic conjunctivitis     Allergic conjunctivitis     Other forms of retinal detachment(361.89)      PONV (postoperative nausea and vomiting)      Unspecified hypothyroidism       Past Surgical History:   Procedure Laterality Date     addisonian crisis       COLONOSCOPY N/A 3/8/2021    Procedure: COLONOSCOPY, WITH POLYPECTOMY;  Surgeon: Natalia Bangura MD;  [place]: [facility] [place]      TOOTH EXTRACTION W/FORCEP       ZZC REPAIR DETACH RETINA,SCLERAL BUCKLE        Allergies   Allergen Reactions     Sulfa Antibiotics Hives     Pcn [Penicillins] Rash      Social History     Tobacco Use     Smoking status: Never     Passive exposure: Never     Smokeless tobacco: Never   Substance Use Topics     Alcohol use: Yes     Comment: 1-2 per week      Wt Readings from Last 1 Encounters:   23 59.2 kg (130 lb 8.2 oz)        Anesthesia Evaluation   Pt has had prior anesthetic. Type: General.    History of anesthetic complications  - PONV.      ROS/MED HX  ENT/Pulmonary:       Neurologic:       Cardiovascular:       METS/Exercise Tolerance:     Hematologic:       Musculoskeletal:       GI/Hepatic:       Renal/Genitourinary:       Endo:     (+)          thyroid problem, hypothyroidism, Chronic steroid usage for  Date most recently used: Nickolas's disease, pt doubled her normal dose this morning per endocrinologists recomendation..        Psychiatric/Substance Use:       Infectious Disease:       Malignancy:       Other:          Physical Exam    Airway        Mallampati: II   TM  "distance: > 3 FB   Neck ROM: full   Mouth opening: > 3 cm    Respiratory Devices and Support         Dental       (+) Minor Abnormalities - some fillings, tiny chips      Cardiovascular   cardiovascular exam normal       Rhythm and rate: regular and normal     Pulmonary   pulmonary exam normal        breath sounds clear to auscultation       OUTSIDE LABS:  CBC:   Lab Results   Component Value Date    WBC 9.9 12/08/2023    WBC 10.8 09/15/2023    HGB 13.3 12/08/2023    HGB 12.6 09/15/2023    HCT 41.2 12/08/2023    HCT 39.6 09/15/2023     12/08/2023     09/15/2023     BMP:   Lab Results   Component Value Date     12/08/2023     09/15/2023    POTASSIUM 4.0 12/08/2023    POTASSIUM 3.7 09/15/2023    CHLORIDE 101 12/08/2023    CHLORIDE 100 09/15/2023    CO2 25 12/08/2023    CO2 28 09/15/2023    BUN 10.9 12/08/2023    BUN 9.6 09/15/2023    CR 0.83 12/08/2023    CR 0.87 09/15/2023     (H) 12/08/2023    GLC 83 09/15/2023     COAGS: No results found for: \"PTT\", \"INR\", \"FIBR\"  POC:   Lab Results   Component Value Date    HCG Negative 03/08/2021     HEPATIC:   Lab Results   Component Value Date    ALBUMIN 4.3 09/15/2023    PROTTOTAL 6.9 09/15/2023    ALT 19 09/15/2023    AST 23 09/15/2023    ALKPHOS 50 09/15/2023    BILITOTAL 0.3 09/15/2023     OTHER:   Lab Results   Component Value Date    A1C 5.0 09/15/2023    REUBEN 9.5 12/08/2023    PHOS 4.1 01/20/2009    MAG 1.9 04/10/2023    LIPASE 118 11/24/2012    TSH 1.03 12/08/2023    T4 1.87 (H) 09/15/2023    T3 321 (H) 12/17/2002       Anesthesia Plan    ASA Status:  2    NPO Status:  NPO Appropriate    Anesthesia Type: MAC.     - Reason for MAC: immobility needed, straight local not clinically adequate   Induction: Intravenous, Propofol.   Maintenance: TIVA.        Consents    Anesthesia Plan(s) and associated risks, benefits, and realistic alternatives discussed. Questions answered and patient/representative(s) expressed understanding.     - Discussed: " Risks, Benefits and Alternatives for BOTH SEDATION and the PROCEDURE were discussed     - Discussed with:  Patient      - Extended Intubation/Ventilatory Support Discussed: No.      - Patient is DNR/DNI Status: No     Use of blood products discussed: No .     Postoperative Care    Pain management: IV analgesics, Oral pain medications.   PONV prophylaxis: Ondansetron (or other 5HT-3), Dexamethasone or Solumedrol, Background Propofol Infusion     Comments:             Ty Winter MD    I have reviewed the pertinent notes and labs in the chart from the past 30 days and (re)examined the patient.  Any updates or changes from those notes are reflected in this note.

## 2023-12-11 NOTE — OP NOTE
Date of service: December 11, 2023     Preoperative diagnosis:  endocervical polyp  Postoperative diagnosis:  endocervical polyp      Procedure:  Operative hysteroscopy with Myosure      Surgeon:  Dr. Eveline Lopez      Anesthesia:  MAC IV sedation and paracervical block  EBL:  10 mL  IVF: see anesthesia record  UOP:  none  Fluid deficit:  100mL      Specimens:  endometrial curetting/cervical polyp from morcellation      Complications: None apparent      Findings:     Speculum exam revealed normal cervix with no lesions.  Hysteroscopy revealed small polyp in the cervix and some polypoid tissue on posterior wall.  Otherwise normal-appearing uterine cavity. Tubal ostia bilaterally normal.          Indications:  Ashia Ying had an ultrasound done as she continued to have irregular bleeding on OCP and we had tried other OCPs.  ultrasound done showed probable cervical polyp.  She was offered a hysteroscopy for removal of the polyp. Risks, benefits, and alternatives to the procedure were discussed with the patient who elected to proceed.  All questions were answered and an informed consent was obtained.      Procedure:  The patient was taken to the operating room where she underwent IV sedation.  She was placed in a dorsal lithotomy position using yellow-fin stirrups.  The patient was examined for the above noted findings and then prepped and draped in the usual sterile fashion.  A medium graves speculum was inserted into the vagina. A tenaculum was placed on the anterior cervical lip.  A paracervical block was administered with 10cc 1% plain lidocaine at the 4 and 8 o'clock positions. The endocervical canal was serially dilated to 6 mm using Hegar dilators.  The hysteroscope was inserted without difficulty with the above findings noted.  The Myosure was used to resect the polyp.  The uterine cavity was inspected and remaining polypoid tissue removed and the hysteroscope was removed. The tenaculum was removed  from the cervix and the puncture sites noted to be hemostatic with pressure.  The patient was repositioned to the supine position.  The patient tolerated the procedure well and was taken to the recovery room in stable condition.      Eveline Lopez MD

## 2023-12-11 NOTE — ANESTHESIA CARE TRANSFER NOTE
Patient: Ashia Ying    Procedure: Procedure(s):  HYSTEROSCOPY, WITH DILATION AND CURETTAGE OF UTERUS USING MORCELLATOR       Diagnosis: Endocervical polyp [N84.1]  Diagnosis Additional Information: No value filed.    Anesthesia Type:   MAC     Note:    Oropharynx: spontaneously breathing  Level of Consciousness: awake  Oxygen Supplementation: room air    Independent Airway: airway patency satisfactory and stable  Dentition: dentition unchanged  Vital Signs Stable: post-procedure vital signs reviewed and stable  Report to RN Given: handoff report given  Patient transferred to: Phase II    Handoff Report: Identifed the Patient, Identified the Reponsible Provider, Reviewed the pertinent medical history, Discussed the surgical course, Reviewed Intra-OP anesthesia mangement and issues during anesthesia, Set expectations for post-procedure period and Allowed opportunity for questions and acknowledgement of understanding    Vitals:  Vitals Value Taken Time   /70 12/11/23 1437   Temp     Pulse 75 12/11/23 1437   Resp     SpO2 100 % 12/11/23 1441   Vitals shown include unfiled device data.    Electronically Signed By: JOMAR Weaver CRNA  December 11, 2023  2:42 PM

## 2023-12-12 LAB
PATH REPORT.COMMENTS IMP SPEC: NORMAL
PATH REPORT.COMMENTS IMP SPEC: NORMAL
PATH REPORT.FINAL DX SPEC: NORMAL
PATH REPORT.GROSS SPEC: NORMAL
PATH REPORT.RELEVANT HX SPEC: NORMAL
PHOTO IMAGE: NORMAL

## 2023-12-12 ASSESSMENT — ACTIVITIES OF DAILY LIVING (ADL)
ADLS_ACUITY_SCORE: 35

## 2023-12-12 NOTE — ANESTHESIA POSTPROCEDURE EVALUATION
Patient: Ashia Ying    Procedure: Procedure(s):  HYSTEROSCOPY, WITH DILATION AND CURETTAGE OF UTERUS USING MORCELLATOR       Anesthesia Type:  MAC    Note:  Disposition: Outpatient   Postop Pain Control: Uneventful            Sign Out: Well controlled pain   PONV: No   Neuro/Psych: Uneventful            Sign Out: Acceptable/Baseline neuro status   Airway/Respiratory: Uneventful            Sign Out: Acceptable/Baseline resp. status   CV/Hemodynamics: Uneventful            Sign Out: Acceptable CV status; No obvious hypovolemia; No obvious fluid overload   Other NRE: NONE   DID A NON-ROUTINE EVENT OCCUR? No       Last vitals:  Vitals Value Taken Time   /68 12/11/23 1533   Temp 36.4  C (97.5  F) 12/11/23 1530   Pulse 67 12/11/23 1533   Resp 16 12/11/23 1530   SpO2 99 % 12/11/23 1546   Vitals shown include unfiled device data.    Electronically Signed By: Ty Winter MD  December 12, 2023  3:32 PM

## 2023-12-13 ASSESSMENT — ACTIVITIES OF DAILY LIVING (ADL)
ADLS_ACUITY_SCORE: 35

## 2023-12-14 LAB — T4 FREE SERPL DIALY-MCNC: 2.1 NG/DL

## 2023-12-14 ASSESSMENT — ACTIVITIES OF DAILY LIVING (ADL)
ADLS_ACUITY_SCORE: 35

## 2023-12-15 ASSESSMENT — ACTIVITIES OF DAILY LIVING (ADL)
ADLS_ACUITY_SCORE: 35

## 2024-02-05 DIAGNOSIS — E27.1 ADDISON'S DISEASE (H): ICD-10-CM

## 2024-02-05 DIAGNOSIS — E03.9 HYPOTHYROIDISM, UNSPECIFIED TYPE: ICD-10-CM

## 2024-02-05 RX ORDER — HYDROCORTISONE 5 MG/1
TABLET ORAL
Qty: 160 TABLET | Refills: 1 | Status: SHIPPED | OUTPATIENT
Start: 2024-02-05 | End: 2024-04-01

## 2024-02-05 NOTE — TELEPHONE ENCOUNTER
Continue hydrocortisone 10 mg in the morning, 10 mg at noon and 2.5 mg at 3 PM     Requested Prescriptions   Pending Prescriptions Disp Refills    hydrocortisone (CORTEF) 5 MG tablet 160 tablet 1     Sig: 10 mg (2 tab) PO Q AM, 10 mg (2 tab) PO q noon, 2.5 mg (HALF tab) PO mid-afternoon around 3 PM. Double or triple dose for 3 days for moderate illness as needed.       There is no refill protocol information for this order

## 2024-03-20 ENCOUNTER — OFFICE VISIT (OUTPATIENT)
Dept: DERMATOLOGY | Facility: CLINIC | Age: 49
End: 2024-03-20
Payer: COMMERCIAL

## 2024-03-20 DIAGNOSIS — D48.5 NEOPLASM OF UNCERTAIN BEHAVIOR OF SKIN: ICD-10-CM

## 2024-03-20 DIAGNOSIS — L82.1 SEBORRHEIC KERATOSIS: ICD-10-CM

## 2024-03-20 DIAGNOSIS — L81.4 LENTIGO: ICD-10-CM

## 2024-03-20 DIAGNOSIS — D22.9 MULTIPLE BENIGN NEVI: ICD-10-CM

## 2024-03-20 DIAGNOSIS — D18.01 CHERRY ANGIOMA: Primary | ICD-10-CM

## 2024-03-20 PROCEDURE — 88342 IMHCHEM/IMCYTCHM 1ST ANTB: CPT | Performed by: PATHOLOGY

## 2024-03-20 PROCEDURE — 11102 TANGNTL BX SKIN SINGLE LES: CPT | Performed by: PHYSICIAN ASSISTANT

## 2024-03-20 PROCEDURE — 88305 TISSUE EXAM BY PATHOLOGIST: CPT | Performed by: PATHOLOGY

## 2024-03-20 PROCEDURE — 99213 OFFICE O/P EST LOW 20 MIN: CPT | Mod: 25 | Performed by: PHYSICIAN ASSISTANT

## 2024-03-20 NOTE — PATIENT INSTRUCTIONS
Wound Care Instructions     FOR SUPERFICIAL WOUNDS     [facility], [facility] or    [facility] [phone]          AFTER 24 HOURS YOU SHOULD REMOVE THE BANDAGE AND BEGIN DAILY DRESSING CHANGES AS FOLLOWS:     1) Remove Dressing.     2) Clean and dry the area with tap water using a Q-tip or sterile gauze pad.     3) Apply Vaseline, Aquaphor, Polysporin ointment or Bacitracin ointment over entire wound.  Do NOT use Neosporin ointment.     4) Cover the wound with a band-aid, or a sterile non-stick gauze pad and micropore paper tape      REPEAT THESE INSTRUCTIONS AT LEAST ONCE A DAY UNTIL THE WOUND HAS COMPLETELY HEALED.    It is an old wives tale that a wound heals better when it is exposed to air and allowed to dry out. The wound will heal faster with a better cosmetic result if it is kept moist with ointment and covered with a bandage.    **Do not let the wound dry out.**      Supplies Needed:      *Cotton tipped applicators (Q-tips)    *Polysporin Ointment or Bacitracin Ointment (NOT NEOSPORIN)    *Band-aids or non-stick gauze pads and micropore paper tape.      PATIENT INFORMATION:    During the healing process you will notice a number of changes. All wounds develop a small halo of redness surrounding the wound.  This means healing is occurring. Severe itching with extensive redness usually indicates sensitivity to the ointment or bandage tape used to dress the wound.  You should call our office if this develops.      Swelling  and/or discoloration around your surgical site is common, particularly when performed around the eye.    All wounds normally drain.  The larger the wound the more drainage there will be.  After 7-10 days, you will notice the wound beginning to shrink and new skin will begin to grow.  The wound is healed when you can see skin has formed over the entire area.  A healed wound has a healthy, shiny look to the surface and is red to dark pink in color to normalize.   Wounds may take approximately 4-6 weeks to heal.  Larger wounds may take 6-8 weeks.  After the wound is healed you may discontinue dressing changes.    You may experience a sensation of tightness as your wound heals. This is normal and will gradually subside.    Your healed wound may be sensitive to temperature changes. This sensitivity improves with time, but if you re having a lot of discomfort, try to avoid temperature extremes.    Patients frequently experience itching after their wound appears to have healed because of the continue healing under the skin.  Plain Vaseline will help relieve the itching.        POSSIBLE COMPLICATIONS    BLEEDING:    Leave the bandage in place.  Use tightly rolled up gauze or a cloth to apply direct pressure over the bandage for 30  minutes.  Reapply pressure for an additional 30 minutes if necessary  Use additional gauze and tape to maintain pressure once the bleeding has stopped.       Patient Education       Proper skin care from [facility]:    -Eliminate harsh soaps as they strip the natural oils from the skin, often resulting in dry itchy skin ( i.e. Dial, Zest, Natividad Spring)  -Use mild soaps such as Cetaphil or Dove Sensitive Skin in the shower. You do not need to use soap on arms, legs, and trunk every time you shower unless visibly soiled.   -Avoid hot or cold showers.  -After showering, lightly dry off and apply moisturizing within 2-3 minutes. This will help trap moisture in the skin.   -Aggressive use of a moisturizer at least 1-2 times a day to the entire body (including -Vanicream, Cetaphil, Aquaphor or Cerave) and moisturize hands after every washing.  -We recommend using moisturizers that come in a tub that needs to be scooped out, not a pump. This has more of an oil base. It will hold moisture in your skin much better than a water base moisturizer. The above recommended are non-pore clogging.      Wear a sunscreen with at least SPF 30 on your face, ears,  neck and V of the chest daily. Wear sunscreen on other areas of the body if those areas are exposed to the sun throughout the day. Sunscreens can contain physical and/or chemical blockers. Physical blockers are less likely to clog pores, these include zinc oxide and titanium dioxide. Reapply every two hour and after swimming.     Sunscreen examples: https://www.ewg.org/sunscreen/    UV radiation  UVA radiation remains constant throughout the day and throughout the year. It is a longer wavelength than UVB and therefore penetrates deeper into the skin leading to immediate and delayed tanning, photoaging, and skin cancer. 70-80% of UVA and UVB radiation occurs between the hours of 10am-2pm.  UVB radiation  UVB radiation causes the most harmful effects and is more significant during the summer months. However, snow and ice can reflect UVB radiation leading to skin damage during the winter months as well. UVB radiation is responsible for tanning, burning, inflammation, delayed erythema (pinkness), pigmentation (brown spots), and skin cancer.     I recommend self monthly full body exams and yearly full body exams with a dermatology provider. If you develop a new or changing lesion please follow up for examination. Most skin cancers are pink and scaly or pink and pearly. However, we do see blue/brown/black skin cancers.  Consider the ABCDEs of melanoma when giving yourself your monthly full body exam ( don't forget the groin, buttocks, feet, toes, etc). A-asymmetry, B-borders, C-color, D-diameter, E-elevation or evolving. If you see any of these changes please follow up in clinic. If you cannot see your back I recommend purchasing a hand held mirror to use with a larger wall mirror.       Checking for Skin Cancer  You can find cancer early by checking your skin each month. There are 3 kinds of skin cancer. They are melanoma, basal cell carcinoma, and squamous cell carcinoma. Doing monthly skin checks is the best way to  find new marks or skin changes. Follow the instructions below for checking your skin.   The ABCDEs of checking moles for melanoma   Check your moles or growths for signs of melanoma using ABCDE:   Asymmetry: the sides of the mole or growth don t match  Border: the edges are ragged, notched, or blurred  Color: the color within the mole or growth varies  Diameter: the mole or growth is larger than 6 mm (size of a pencil eraser)  Evolving: the size, shape, or color of the mole or growth is changing (evolving is not shown in the images below)    Checking for other types of skin cancer  Basal cell carcinoma or squamous cell carcinoma have symptoms such as:     A spot or mole that looks different from all other marks on your skin  Changes in how an area feels, such as itching, tenderness, or pain  Changes in the skin's surface, such as oozing, bleeding, or scaliness  A sore that does not heal  New swelling or redness beyond the border of a mole    Who s at risk?  Anyone can get skin cancer. But you are at greater risk if you have:   Fair skin, light-colored hair, or light-colored eyes  Many moles or abnormal moles on your skin  A history of sunburns from sunlight or tanning beds  A family history of skin cancer  A history of exposure to radiation or chemicals  A weakened immune system  If you have had skin cancer in the past, you are at risk for recurring skin cancer.   How to check your skin  Do your monthly skin checkups in front of a full-length mirror. Check all parts of your body, including your:   Head (ears, face, neck, and scalp)  Torso (front, back, and sides)  Arms (tops, undersides, upper, and lower armpits)  Hands (palms, backs, and fingers, including under the nails)  Buttocks and genitals  Legs (front, back, and sides)  Feet (tops, soles, toes, including under the nails, and between toes)  If you have a lot of moles, take digital photos of them each month. Make sure to take photos both up close and from a  distance. These can help you see if any moles change over time.   Most skin changes are not cancer. But if you see any changes in your skin, call your doctor right away. Only he or she can diagnose a problem. If you have skin cancer, seeing your doctor can be the first step toward getting the treatment that could save your life.   [employer] last reviewed this educational content on 4/1/2019 2000-2020 [employer]. [address] [place] [address], [place] [address]. All rights reserved. This information is not intended as a substitute for professional medical care. Always follow your healthcare professional's instructions.       When should I call my doctor?  If you are worsening or not improving, please, contact us or seek urgent care as noted below.     Who should I call with questions (adults)?  [facility] (adult and pediatric): [phone]  [facility] (adult): [phone]  [facility] ([facility] and [place]) [phone]  For urgent needs outside of business hours call the [facility] at [phone] and ask for the dermatology resident on call to be paged  If this is a medical emergency and you are unable to reach an ER, Call [phone]      If you need a prescription refill, please contact your pharmacy. Refills are approved or denied by our Physicians during normal business hours, Monday through Fridays  Per office policy, refills will not be granted if you have not been seen within the past year (or sooner depending on your child's condition)

## 2024-03-20 NOTE — PROGRESS NOTES
"Ashai Ying is an extremely pleasant 48 year old year old female patient here today for skin check. She notes mole slightly irregular on right thigh. Maybe growing slightly.  Patient has no other skin complaints today.  Remainder of the HPI, Meds, PMH, Allergies, FH, and SH was reviewed in chart.    Pertinent Hx:   No personal history of skin cancer. Family history of BCC  Past Medical History:   Diagnosis Date    Allergic rhinitis, cause unspecified     Chronic lymphocytic thyroiditis     Corticoadrenal insufficiency     Dr. Freddie AGUILAR of [place] [facility]    Extrinsic asthma, unspecified     Other chronic allergic conjunctivitis     Allergic conjunctivitis    Other forms of retinal detachment(361.89)     PONV (postoperative nausea and vomiting)     Unspecified hypothyroidism        Past Surgical History:   Procedure Laterality Date    addisonian crisis  2002    COLONOSCOPY N/A 3/8/2021    Procedure: COLONOSCOPY, WITH POLYPECTOMY;  Surgeon: Natalia Bangura MD;  Location: [facility] OR    DILATION AND CURETTAGE, OPERATIVE HYSTEROSCOPY WITH MORCELLATOR, COMBINED N/A 12/11/2023    Procedure: HYSTEROSCOPY, WITH DILATION AND CURETTAGE OF UTERUS USING MORCELLATOR;  Surgeon: Eveline Lopez MD;  Location: [facility] OR    HC TOOTH EXTRACTION W/FORCEP      ZZC REPAIR DETACH RETINA,SCLERAL BUCKLE  2003        Family History   Problem Relation Age of Onset    Hypertension Mother     Arthritis Mother     Osteopenia Mother     Colon Polyps Mother     Cancer Father         basal cell cancer    Cerebrovascular Disease Father         CVA    Hypertension Father     Other - See Comments Father         stroke    Basal cell carcinoma Father     Colon Polyps Sister 42    Depression Sister     Asthma Sister     Asthma Sister     Asthma Sister     Cardiovascular Maternal Grandmother         \"heart troubles\"    Breast Cancer Maternal Grandmother     Osteoporosis Maternal Grandmother     Asthma Paternal Grandmother     Cerebrovascular Disease " Paternal Grandmother     Prostate Cancer Paternal Grandfather     Basal cell carcinoma Paternal Uncle     Depression Sister     Asthma Sister     Diabetes No family hx of        Social History     Socioeconomic History    Marital status:      Spouse name: Emmett    Number of children: 2    Years of education: Not on file    Highest education level: Not on file   Occupational History     Employer: [employer]   Tobacco Use    Smoking status: Never     Passive exposure: Never    Smokeless tobacco: Never   Vaping Use    Vaping Use: Never used   Substance and Sexual Activity    Alcohol use: Yes     Comment: 1-2 per week    Drug use: No    Sexual activity: Yes     Partners: Male     Birth control/protection: Pill   Other Topics Concern    Parent/sibling w/ CABG, MI or angioplasty before 65F 55M? No   Social History Narrative    Not on file     Social Determinants of Health     Financial Resource Strain: Low Risk  (11/14/2023)    Financial Resource Strain     Within the past 12 months, have you or your family members you live with been unable to get utilities (heat, electricity) when it was really needed?: No   Food Insecurity: Low Risk  (11/14/2023)    Food Insecurity     Within the past 12 months, did you worry that your food would run out before you got money to buy more?: No     Within the past 12 months, did the food you bought just not last and you didn t have money to get more?: No   Transportation Needs: Low Risk  (11/14/2023)    Transportation Needs     Within the past 12 months, has lack of transportation kept you from medical appointments, getting your medicines, non-medical meetings or appointments, work, or from getting things that you need?: No   Physical Activity: Not on file   Stress: Not on file   Social Connections: Not on file   Interpersonal Safety: Low Risk  (11/17/2023)    Interpersonal Safety     Do you feel physically and emotionally safe where you currently live?: Yes     Within the past 12  months, have you been hit, slapped, kicked or otherwise physically hurt by someone?: No     Within the past 12 months, have you been humiliated or emotionally abused in other ways by your partner or ex-partner?: No   Housing Stability: Low Risk  (11/14/2023)    Housing Stability     Do you have housing? : Yes     Are you worried about losing your housing?: No       Outpatient Encounter Medications as of 3/20/2024   Medication Sig Dispense Refill    Cholecalciferol (VITAMIN D) 2000 UNIT tablet Take 1 tablet by mouth daily.      doxylamine (UNISOM) 25 MG TABS tablet Take 25 mg by mouth At Bedtime      fludrocortisone (FLORINEF) 0.1 MG tablet Take 1.5 tablets (0.15 mg) by mouth daily 135 tablet 1    hydrocortisone (CORTEF) 5 MG tablet 10 mg (2 tab) PO Q AM, 10 mg (2 tab) PO q noon, 2.5 mg (HALF tab) PO mid-afternoon around 3 PM. Double or triple dose for 3 days for moderate illness as needed. 160 tablet 1    levothyroxine (SYNTHROID/LEVOTHROID) 125 MCG tablet Take 1 tablet (125 mcg) by mouth daily 90 tablet 3    norethindrone-ethinyl estradiol (NECON) 0.5-35 MG-MCG tablet Take 1 tablet by mouth daily Active tablets only for prevention of menses 112 tablet 4    WOMENS DAILY MULTIVITAMIN OR TABS 1 TABLET DAILY 30 0     No facility-administered encounter medications on file as of 3/20/2024.             O:   NAD, WDWN, Alert & Oriented, Mood & Affect wnl, Vitals stable   Here today alone   There were no vitals taken for this visit.   General appearance normal   Vitals stable   Alert, oriented and in no acute distress      0.5 cm brown irregular macule on right anterior thigh   Stuck on papules and brown macules on trunk and ext   Red papules on trunk  Brown papules and macules with regular pigment network and border on torso and extremities      The remainder of skin exam is normal       Eyes: Conjunctivae/lids:Normal     ENT: Lips normal    MSK:Normal    Cardiovascular: peripheral edema none    Pulm: Breathing  Normal    Neuro/Psych: Orientation:Alert and Orientedx3 ; Mood/Affect:normal   A/P:  1. R/O atypical nevus on right anterior thigh   TANGENTIAL BIOPSY SENT OUT:  After consent, anesthesia with LEC and prep, tangential excision performed and specimen sent out for permanent section histology.  No complications and routine wound care. Patient told to call our office in 1-2 weeks for result.      2. Seborrheic keratosis, lentigo, angioma, benign nevi   It was a pleasure speaking to Ashia Ying today.  BENIGN LESIONS DISCUSSED WITH PATIENT:  I discussed the specifics of tumor, prognosis, and genetics of benign lesions.  I explained that treatment of these lesions would be purely cosmetic and not medically neccessary.  I discussed with patient different removal options including excision, cautery and /or laser.      Nature and genetics of benign skin lesions dicussed with patient.  Signs and Symptoms of skin cancer discussed with patient.  ABCDEs of melanoma reviewed with patient.  Patient encouraged to perform monthly skin exams.  UV precautions reviewed with patient.  Risks of non-melanoma skin cancer discussed with patient   Return to clinic in one year or sooner if needed.

## 2024-03-20 NOTE — LETTER
3/20/2024         RE: Ashia Ying  [address] [place] [address]        Dear Colleague,    Thank you for referring your patient, Ashia Ying, to the [facility]. Please see a copy of my visit note below.    Ashia Ying is an extremely pleasant 48 year old year old female patient here today for skin check. She notes mole slightly irregular on right thigh. Maybe growing slightly.  Patient has no other skin complaints today.  Remainder of the HPI, Meds, PMH, Allergies, FH, and SH was reviewed in chart.    Pertinent Hx:   No personal history of skin cancer. Family history of BCC  Past Medical History:   Diagnosis Date     Allergic rhinitis, cause unspecified      Chronic lymphocytic thyroiditis      Corticoadrenal insufficiency     Dr. Freddie AGUILAR of [place] [facility]     Extrinsic asthma, unspecified      Other chronic allergic conjunctivitis     Allergic conjunctivitis     Other forms of retinal detachment(361.89)      PONV (postoperative nausea and vomiting)      Unspecified hypothyroidism        Past Surgical History:   Procedure Laterality Date     addisonian crisis  2002     COLONOSCOPY N/A 3/8/2021    Procedure: COLONOSCOPY, WITH POLYPECTOMY;  Surgeon: Natalia Bangura MD;  Location: [facility] OR     DILATION AND CURETTAGE, OPERATIVE HYSTEROSCOPY WITH MORCELLATOR, COMBINED N/A 12/11/2023    Procedure: HYSTEROSCOPY, WITH DILATION AND CURETTAGE OF UTERUS USING MORCELLATOR;  Surgeon: Eveline Lopez MD;  Location: [facility] OR     HC TOOTH EXTRACTION W/FORCEP       ZZC REPAIR DETACH RETINA,SCLERAL BUCKLE  2003        Family History   Problem Relation Age of Onset     Hypertension Mother      Arthritis Mother      Osteopenia Mother      Colon Polyps Mother      Cancer Father         basal cell cancer     Cerebrovascular Disease Father         CVA     Hypertension Father      Other - See Comments Father         stroke     Basal cell carcinoma Father      Colon Polyps Sister 42      "Depression Sister      Asthma Sister      Asthma Sister      Asthma Sister      Cardiovascular Maternal Grandmother         \"heart troubles\"     Breast Cancer Maternal Grandmother      Osteoporosis Maternal Grandmother      Asthma Paternal Grandmother      Cerebrovascular Disease Paternal Grandmother      Prostate Cancer Paternal Grandfather      Basal cell carcinoma Paternal Uncle      Depression Sister      Asthma Sister      Diabetes No family hx of        Social History     Socioeconomic History     Marital status:      Spouse name: Emmett     Number of children: 2     Years of education: Not on file     Highest education level: Not on file   Occupational History     Employer: [employer]   Tobacco Use     Smoking status: Never     Passive exposure: Never     Smokeless tobacco: Never   Vaping Use     Vaping Use: Never used   Substance and Sexual Activity     Alcohol use: Yes     Comment: 1-2 per week     Drug use: No     Sexual activity: Yes     Partners: Male     Birth control/protection: Pill   Other Topics Concern     Parent/sibling w/ CABG, MI or angioplasty before 65F 55M? No   Social History Narrative     Not on file     Social Determinants of Health     Financial Resource Strain: Low Risk  (11/14/2023)    Financial Resource Strain      Within the past 12 months, have you or your family members you live with been unable to get utilities (heat, electricity) when it was really needed?: No   Food Insecurity: Low Risk  (11/14/2023)    Food Insecurity      Within the past 12 months, did you worry that your food would run out before you got money to buy more?: No      Within the past 12 months, did the food you bought just not last and you didn t have money to get more?: No   Transportation Needs: Low Risk  (11/14/2023)    Transportation Needs      Within the past 12 months, has lack of transportation kept you from medical appointments, getting your medicines, non-medical meetings or appointments, work, or " from getting things that you need?: No   Physical Activity: Not on file   Stress: Not on file   Social Connections: Not on file   Interpersonal Safety: Low Risk  (11/17/2023)    Interpersonal Safety      Do you feel physically and emotionally safe where you currently live?: Yes      Within the past 12 months, have you been hit, slapped, kicked or otherwise physically hurt by someone?: No      Within the past 12 months, have you been humiliated or emotionally abused in other ways by your partner or ex-partner?: No   Housing Stability: Low Risk  (11/14/2023)    Housing Stability      Do you have housing? : Yes      Are you worried about losing your housing?: No       Outpatient Encounter Medications as of 3/20/2024   Medication Sig Dispense Refill     Cholecalciferol (VITAMIN D) 2000 UNIT tablet Take 1 tablet by mouth daily.       doxylamine (UNISOM) 25 MG TABS tablet Take 25 mg by mouth At Bedtime       fludrocortisone (FLORINEF) 0.1 MG tablet Take 1.5 tablets (0.15 mg) by mouth daily 135 tablet 1     hydrocortisone (CORTEF) 5 MG tablet 10 mg (2 tab) PO Q AM, 10 mg (2 tab) PO q noon, 2.5 mg (HALF tab) PO mid-afternoon around 3 PM. Double or triple dose for 3 days for moderate illness as needed. 160 tablet 1     levothyroxine (SYNTHROID/LEVOTHROID) 125 MCG tablet Take 1 tablet (125 mcg) by mouth daily 90 tablet 3     norethindrone-ethinyl estradiol (NECON) 0.5-35 MG-MCG tablet Take 1 tablet by mouth daily Active tablets only for prevention of menses 112 tablet 4     WOMENS DAILY MULTIVITAMIN OR TABS 1 TABLET DAILY 30 0     No facility-administered encounter medications on file as of 3/20/2024.             O:   NAD, WDWN, Alert & Oriented, Mood & Affect wnl, Vitals stable   Here today alone   There were no vitals taken for this visit.   General appearance normal   Vitals stable   Alert, oriented and in no acute distress      0.5 cm brown irregular macule on right anterior thigh   Stuck on papules and brown macules on  trunk and ext   Red papules on trunk  Brown papules and macules with regular pigment network and border on torso and extremities      The remainder of skin exam is normal       Eyes: Conjunctivae/lids:Normal     ENT: Lips normal    MSK:Normal    Cardiovascular: peripheral edema none    Pulm: Breathing Normal    Neuro/Psych: Orientation:Alert and Orientedx3 ; Mood/Affect:normal   A/P:  1. R/O atypical nevus on right anterior thigh   TANGENTIAL BIOPSY SENT OUT:  After consent, anesthesia with LEC and prep, tangential excision performed and specimen sent out for permanent section histology.  No complications and routine wound care. Patient told to call our office in 1-2 weeks for result.      2. Seborrheic keratosis, lentigo, angioma, benign nevi   It was a pleasure speaking to Ashia Ying today.  BENIGN LESIONS DISCUSSED WITH PATIENT:  I discussed the specifics of tumor, prognosis, and genetics of benign lesions.  I explained that treatment of these lesions would be purely cosmetic and not medically neccessary.  I discussed with patient different removal options including excision, cautery and /or laser.      Nature and genetics of benign skin lesions dicussed with patient.  Signs and Symptoms of skin cancer discussed with patient.  ABCDEs of melanoma reviewed with patient.  Patient encouraged to perform monthly skin exams.  UV precautions reviewed with patient.  Risks of non-melanoma skin cancer discussed with patient   Return to clinic in one year or sooner if needed.       Again, thank you for allowing me to participate in the care of your patient.        Sincerely,        Janelle Molina PA-C

## 2024-03-25 LAB
PATH REPORT.COMMENTS IMP SPEC: NORMAL
PATH REPORT.FINAL DX SPEC: NORMAL
PATH REPORT.GROSS SPEC: NORMAL
PATH REPORT.MICROSCOPIC SPEC OTHER STN: NORMAL
PATH REPORT.RELEVANT HX SPEC: NORMAL

## 2024-04-01 DIAGNOSIS — E27.1 ADDISON'S DISEASE (H): ICD-10-CM

## 2024-04-01 DIAGNOSIS — E03.9 HYPOTHYROIDISM, UNSPECIFIED TYPE: ICD-10-CM

## 2024-04-01 RX ORDER — LEVOTHYROXINE SODIUM 125 UG/1
125 TABLET ORAL DAILY
Qty: 90 TABLET | Refills: 0 | Status: SHIPPED | OUTPATIENT
Start: 2024-04-01 | End: 2024-04-19

## 2024-04-01 RX ORDER — HYDROCORTISONE 5 MG/1
TABLET ORAL
Qty: 160 TABLET | Refills: 1 | Status: SHIPPED | OUTPATIENT
Start: 2024-04-01 | End: 2024-04-19

## 2024-04-01 NOTE — TELEPHONE ENCOUNTER
Requested Prescriptions   Pending Prescriptions Disp Refills    hydrocortisone (CORTEF) 5 MG tablet 160 tablet 1     Sig: 10 mg (2 tab) PO Q AM, 10 mg (2 tab) PO q noon, 2.5 mg (HALF tab) PO mid-afternoon around 3 PM. Double or triple dose for 3 days for moderate illness as needed.       There is no refill protocol information for this order       levothyroxine (SYNTHROID/LEVOTHROID) 125 MCG tablet 90 tablet 0     Sig: Take 1 tablet (125 mcg) by mouth daily       There is no refill protocol information for this order

## 2024-04-12 ENCOUNTER — LAB (OUTPATIENT)
Dept: LAB | Facility: CLINIC | Age: 49
End: 2024-04-12
Payer: COMMERCIAL

## 2024-04-12 DIAGNOSIS — E27.1 ADDISON'S DISEASE (H): ICD-10-CM

## 2024-04-12 DIAGNOSIS — E03.9 HYPOTHYROIDISM, UNSPECIFIED TYPE: ICD-10-CM

## 2024-04-12 LAB
ALBUMIN SERPL BCG-MCNC: 4.3 G/DL (ref 3.5–5.2)
ALP SERPL-CCNC: 46 U/L (ref 40–150)
ALT SERPL W P-5'-P-CCNC: 13 U/L (ref 0–50)
ANION GAP SERPL CALCULATED.3IONS-SCNC: 11 MMOL/L (ref 7–15)
AST SERPL W P-5'-P-CCNC: 20 U/L (ref 0–45)
BILIRUB SERPL-MCNC: 0.3 MG/DL
BUN SERPL-MCNC: 10.9 MG/DL (ref 6–20)
CALCIUM SERPL-MCNC: 9.3 MG/DL (ref 8.6–10)
CHLORIDE SERPL-SCNC: 100 MMOL/L (ref 98–107)
CREAT SERPL-MCNC: 0.88 MG/DL (ref 0.51–0.95)
DEPRECATED HCO3 PLAS-SCNC: 27 MMOL/L (ref 22–29)
EGFRCR SERPLBLD CKD-EPI 2021: 82 ML/MIN/1.73M2
GLUCOSE SERPL-MCNC: 87 MG/DL (ref 70–99)
POTASSIUM SERPL-SCNC: 3.7 MMOL/L (ref 3.4–5.3)
PROT SERPL-MCNC: 7 G/DL (ref 6.4–8.3)
SODIUM SERPL-SCNC: 138 MMOL/L (ref 135–145)
TSH SERPL DL<=0.005 MIU/L-ACNC: 1.73 UIU/ML (ref 0.3–4.2)

## 2024-04-12 PROCEDURE — 80053 COMPREHEN METABOLIC PANEL: CPT

## 2024-04-12 PROCEDURE — 36415 COLL VENOUS BLD VENIPUNCTURE: CPT

## 2024-04-12 PROCEDURE — 84443 ASSAY THYROID STIM HORMONE: CPT

## 2024-04-19 ENCOUNTER — OFFICE VISIT (OUTPATIENT)
Dept: ENDOCRINOLOGY | Facility: CLINIC | Age: 49
End: 2024-04-19
Payer: COMMERCIAL

## 2024-04-19 VITALS
BODY MASS INDEX: 21.97 KG/M2 | HEART RATE: 68 BPM | DIASTOLIC BLOOD PRESSURE: 78 MMHG | SYSTOLIC BLOOD PRESSURE: 122 MMHG | WEIGHT: 130 LBS

## 2024-04-19 DIAGNOSIS — E27.1 ADDISON'S DISEASE (H): Primary | ICD-10-CM

## 2024-04-19 DIAGNOSIS — E03.9 HYPOTHYROIDISM, UNSPECIFIED TYPE: ICD-10-CM

## 2024-04-19 PROCEDURE — 99214 OFFICE O/P EST MOD 30 MIN: CPT | Performed by: INTERNAL MEDICINE

## 2024-04-19 RX ORDER — FLUDROCORTISONE ACETATE 0.1 MG/1
0.15 TABLET ORAL DAILY
Qty: 135 TABLET | Refills: 1 | Status: SHIPPED | OUTPATIENT
Start: 2024-04-19

## 2024-04-19 RX ORDER — HYDROCORTISONE 5 MG/1
TABLET ORAL
Qty: 405 TABLET | Refills: 1 | Status: SHIPPED | OUTPATIENT
Start: 2024-04-19

## 2024-04-19 RX ORDER — LEVOTHYROXINE SODIUM 125 UG/1
125 TABLET ORAL DAILY
Qty: 90 TABLET | Refills: 1 | Status: SHIPPED | OUTPATIENT
Start: 2024-04-19

## 2024-04-19 NOTE — LETTER
"    4/19/2024         RE: Ashia Ying  [address] [place] [address]        Dear Colleague,    Thank you for referring your patient, Ashia Ying, to the [facility]. Please see a copy of my visit note below.      ENDOCRINOLOGY FOLLOW-UP        HISTORY OF PRESENT ILLNESS    Ashia Ying is seen in follow-up.    1.  Adrenal insufficiency.  After initial visit with me in 4/2022, we adjusted hydrocortisone dose slightly downward.    We also adjusted fludrocortisone dose slightly upward to minimize symptoms of orthostasis that patient was noting.  Notes that orthostatic symptoms are not significantly bothersome although she may still have some lightheadedness occasionally.    Current regimen: Hydrocortisone 10 mg every morning, 10 mg at noon, 2.5 mg at 3 PM (using 5 mg tablets).  Fludrocortisone 0.15 mg daily.    Some fatigue, but fairly acceptable energy level overall.  Has occasional \"puffiness\" of the ankles but no consistent lower extremity edema.    She took stress dose hydrocortisone on the day of surgery to remove endocervical polyp in 12/2023.  Otherwise has not had to increase hydrocortisone for illness.    She has preferred not to get a medical alert bracelet: Does have a card in her wallet with diagnosis of adrenal insufficiency.    2.  Hypothyroidism.  We increased levothyroxine from 112 to 125 mcg daily at last visit in 4/2023.  She continues on this regimen, taking levothyroxine at bedtime.    On OCP: Menstrual cycles occur regularly on placebo weeks.  She had endocervical polyp removed in gynecology and cycles have been lighter with less intermenstrual spotting after this intervention.    Bowel movements are regular.    Pertinent endocrine and related history:  1.  Erie's disease.  Patient previously followed at the [facility], transferred care after a move.  -Primary adrenal insufficiency was diagnosed in her early 20s after she " presented in crisis after progressing symptoms of abdominal pain and fatigue.   2.  Hypothyroidism.  Diagnosed within about a year of diagnosis of adrenal insufficiency.   -She has a history of intermenstrual spotting and has been on OCP as prescribed by her gynecologist.  Cycles occur generally regularly after placebo week.  She is , with 1 pregnancy ending in miscarriage.  No history of infertility/difficulty with conceiving.  Does not plan to have children in the future.    Pertinent Social History: , has 2 children.  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    PAST MEDICAL HISTORY  Past Medical History:   Diagnosis Date     Allergic rhinitis, cause unspecified      Chronic lymphocytic thyroiditis      Corticoadrenal insufficiency     Dr. Freddie AGUILAR of [place] [facility]     Extrinsic asthma, unspecified      Other chronic allergic conjunctivitis     Allergic conjunctivitis     Other forms of retinal detachment(361.89)      PONV (postoperative nausea and vomiting)      Unspecified hypothyroidism        MEDICATIONS  Current Outpatient Medications   Medication Sig Dispense Refill     Cholecalciferol (VITAMIN D) 2000 UNIT tablet Take 1 tablet by mouth daily.       doxylamine (UNISOM) 25 MG TABS tablet Take 25 mg by mouth At Bedtime       fludrocortisone (FLORINEF) 0.1 MG tablet Take 1.5 tablets (0.15 mg) by mouth daily 135 tablet 1     hydrocortisone (CORTEF) 5 MG tablet 10 mg (2 tab) PO Q AM, 10 mg (2 tab) PO q noon, 2.5 mg (HALF tab) PO mid-afternoon around 3 PM. Double or triple dose for 3 days for moderate illness as needed. 405 tablet 1     levothyroxine (SYNTHROID/LEVOTHROID) 125 MCG tablet Take 1 tablet (125 mcg) by mouth daily 90 tablet 1     norethindrone-ethinyl estradiol (NECON) 0.5-35 MG-MCG tablet Take 1 tablet by mouth daily Active tablets only for prevention of menses 112 tablet 4     WOMENS DAILY  MULTIVITAMIN OR TABS 1 TABLET DAILY 30 0       Allergies, family, and social history were reviewed and documented as needed in EHR.     REVIEW OF SYSTEMS  A focused ROS was performed, with pertinent positives and negatives as noted in the HPI.    PHYSICAL EXAM  /78 (BP Location: Right arm, Patient Position: Sitting, Cuff Size: Adult Regular)   Pulse 68   Wt 59 kg (130 lb)   BMI 21.97 kg/m    Body mass index is 21.97 kg/m .  Constitutional: Vital signs reviewed, as recorded above. Patient is alert, oriented and appears in no acute distress.  Eyes: Prominent eyes, although patient notes that this is typical for her; PER, EOMI, no stare, lid lag, or retraction; no conjunctival injection.  ENMT: OP clear with moist MM. Lips are without lesions.   Neck: Neck supple, no palpable thyromegaly.  Lymphatic: No cervical or supraclavicular LAD.  Cardiovascular: RRR, normal S1/S2, no audible murmurs, rubs or gallops; no LE edema.  Respiratory: CTAB, without wheezes, crackles or rhonchi; normal chest wall motion and respiratory effort.  GI: Abdomen is soft and nontender.  MSK: No clubbing or cyanosis; normal muscle bulk and tone.  Skin: Normal skin color, temperature, turgor and texture.  Neurological: Alert and oriented times 3. No tremor.    DATA REVIEW  Each of the following laboratory and/or imaging studies were reviewed.                ASSESSMENT  1.  Adrenal insufficiency, primary.  Treated with hydrocortisone and fludrocortisone.  Hydrocortisone dose is still slightly higher than what I would estimate based on BSA (closer to 20 mg based on estimate) however our slight dose reduction in the past has resulted in normalization of CBC (previously had chronic leukocytosis) which indicates that we are approaching physiologic dosing of hydrocortisone.  No significant orthostatic symptoms.  Would therefore continue current hydrocortisone and fludrocortisone regimen without changes.  Will check ACTH and renin in 6 months:  If ACTH remains completely suppressed, would consider reducing hydrocortisone slightly downward.  We re-discussed IM hydrocortisone: prefers to hold off on having this at home for now.    2.  Hypothyroidism.  Primary.  TSH is in ideal range on current levothyroxine regimen: Continue without changes.    3.  Bone health.  On vitamin D and calcium supplements, albeit not consistently every day.  Can consider DXA scan in the coming few years given glucocorticoid therapy.      PLAN  -Continue all medications without changes  -Labs in 6 months  -Return for a follow-up visit in one year, contact me sooner if concerns  -We will communicate results via [employer], or if needed by phone      Orders Placed This Encounter   Procedures     Adrenal corticotropin     Renin activity     Basic metabolic panel     TSH with free T4 reflex       Leah Moctezuma MD   Division of Diabetes, Endocrinology and Metabolism  Department of Medicine             Again, thank you for allowing me to participate in the care of your patient.        Sincerely,        Leah Moctezuma MD

## 2024-04-19 NOTE — PROGRESS NOTES
"  ENDOCRINOLOGY FOLLOW-UP        HISTORY OF PRESENT ILLNESS    Ashia Ying is seen in follow-up.    1.  Adrenal insufficiency.  After initial visit with me in 4/2022, we adjusted hydrocortisone dose slightly downward.    We also adjusted fludrocortisone dose slightly upward to minimize symptoms of orthostasis that patient was noting.  Notes that orthostatic symptoms are not significantly bothersome although she may still have some lightheadedness occasionally.    Current regimen: Hydrocortisone 10 mg every morning, 10 mg at noon, 2.5 mg at 3 PM (using 5 mg tablets).  Fludrocortisone 0.15 mg daily.    Some fatigue, but fairly acceptable energy level overall.  Has occasional \"puffiness\" of the ankles but no consistent lower extremity edema.    She took stress dose hydrocortisone on the day of surgery to remove endocervical polyp in 12/2023.  Otherwise has not had to increase hydrocortisone for illness.    She has preferred not to get a medical alert bracelet: Does have a card in her wallet with diagnosis of adrenal insufficiency.    2.  Hypothyroidism.  We increased levothyroxine from 112 to 125 mcg daily at last visit in 4/2023.  She continues on this regimen, taking levothyroxine at bedtime.    On OCP: Menstrual cycles occur regularly on placebo weeks.  She had endocervical polyp removed in gynecology and cycles have been lighter with less intermenstrual spotting after this intervention.    Bowel movements are regular.    Pertinent endocrine and related history:  1.  Nickolas's disease.  Patient previously followed at the [facility], transferred care after a move.  -Primary adrenal insufficiency was diagnosed in her early 20s after she presented in crisis after progressing symptoms of abdominal pain and fatigue.   2.  Hypothyroidism.  Diagnosed within about a year of diagnosis of adrenal insufficiency.   -She has a history of intermenstrual spotting and has been on OCP as prescribed " by her gynecologist.  Cycles occur generally regularly after placebo week.  She is , with 1 pregnancy ending in miscarriage.  No history of infertility/difficulty with conceiving.  Does not plan to have children in the future.    Pertinent Social History: , has 2 children.  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    PAST MEDICAL HISTORY  Past Medical History:   Diagnosis Date    Allergic rhinitis, cause unspecified     Chronic lymphocytic thyroiditis     Corticoadrenal insufficiency     Dr. Freddie AGUILAR of [place] [facility]    Extrinsic asthma, unspecified     Other chronic allergic conjunctivitis     Allergic conjunctivitis    Other forms of retinal detachment(361.89)     PONV (postoperative nausea and vomiting)     Unspecified hypothyroidism        MEDICATIONS  Current Outpatient Medications   Medication Sig Dispense Refill    Cholecalciferol (VITAMIN D) 2000 UNIT tablet Take 1 tablet by mouth daily.      doxylamine (UNISOM) 25 MG TABS tablet Take 25 mg by mouth At Bedtime      fludrocortisone (FLORINEF) 0.1 MG tablet Take 1.5 tablets (0.15 mg) by mouth daily 135 tablet 1    hydrocortisone (CORTEF) 5 MG tablet 10 mg (2 tab) PO Q AM, 10 mg (2 tab) PO q noon, 2.5 mg (HALF tab) PO mid-afternoon around 3 PM. Double or triple dose for 3 days for moderate illness as needed. 405 tablet 1    levothyroxine (SYNTHROID/LEVOTHROID) 125 MCG tablet Take 1 tablet (125 mcg) by mouth daily 90 tablet 1    norethindrone-ethinyl estradiol (NECON) 0.5-35 MG-MCG tablet Take 1 tablet by mouth daily Active tablets only for prevention of menses 112 tablet 4    WOMENS DAILY MULTIVITAMIN OR TABS 1 TABLET DAILY 30 0       Allergies, family, and social history were reviewed and documented as needed in EHR.     REVIEW OF SYSTEMS  A focused ROS was performed, with pertinent positives and negatives as noted in the HPI.    PHYSICAL EXAM  BP  122/78 (BP Location: Right arm, Patient Position: Sitting, Cuff Size: Adult Regular)   Pulse 68   Wt 59 kg (130 lb)   BMI 21.97 kg/m    Body mass index is 21.97 kg/m .  Constitutional: Vital signs reviewed, as recorded above. Patient is alert, oriented and appears in no acute distress.  Eyes: Prominent eyes, although patient notes that this is typical for her; PER, EOMI, no stare, lid lag, or retraction; no conjunctival injection.  ENMT: OP clear with moist MM. Lips are without lesions.   Neck: Neck supple, no palpable thyromegaly.  Lymphatic: No cervical or supraclavicular LAD.  Cardiovascular: RRR, normal S1/S2, no audible murmurs, rubs or gallops; no LE edema.  Respiratory: CTAB, without wheezes, crackles or rhonchi; normal chest wall motion and respiratory effort.  GI: Abdomen is soft and nontender.  MSK: No clubbing or cyanosis; normal muscle bulk and tone.  Skin: Normal skin color, temperature, turgor and texture.  Neurological: Alert and oriented times 3. No tremor.    DATA REVIEW  Each of the following laboratory and/or imaging studies were reviewed.                ASSESSMENT  1.  Adrenal insufficiency, primary.  Treated with hydrocortisone and fludrocortisone.  Hydrocortisone dose is still slightly higher than what I would estimate based on BSA (closer to 20 mg based on estimate) however our slight dose reduction in the past has resulted in normalization of CBC (previously had chronic leukocytosis) which indicates that we are approaching physiologic dosing of hydrocortisone.  No significant orthostatic symptoms.  Would therefore continue current hydrocortisone and fludrocortisone regimen without changes.  Will check ACTH and renin in 6 months: If ACTH remains completely suppressed, would consider reducing hydrocortisone slightly downward.  We re-discussed IM hydrocortisone: prefers to hold off on having this at home for now.    2.  Hypothyroidism.  Primary.  TSH is in ideal range on current levothyroxine  regimen: Continue without changes.    3.  Bone health.  On vitamin D and calcium supplements, albeit not consistently every day.  Can consider DXA scan in the coming few years given glucocorticoid therapy.      PLAN  -Continue all medications without changes  -Labs in 6 months  -Return for a follow-up visit in one year, contact me sooner if concerns  -We will communicate results via [employer], or if needed by phone      Orders Placed This Encounter   Procedures    Adrenal corticotropin    Renin activity    Basic metabolic panel    TSH with free T4 reflex       Leah Moctezuma MD   Division of Diabetes, Endocrinology and Metabolism  Department of Medicine

## 2024-04-19 NOTE — PATIENT INSTRUCTIONS
-Continue all medications without changes  -Labs in 6 months  -Return for a follow-up visit in one year, contact me sooner if concerns  -We will communicate results via [employer], or if needed by phone

## 2024-08-08 ENCOUNTER — ANCILLARY PROCEDURE (OUTPATIENT)
Dept: MAMMOGRAPHY | Facility: CLINIC | Age: 49
End: 2024-08-08
Attending: OBSTETRICS & GYNECOLOGY
Payer: COMMERCIAL

## 2024-08-08 DIAGNOSIS — Z12.31 VISIT FOR SCREENING MAMMOGRAM: ICD-10-CM

## 2024-08-08 PROCEDURE — 77063 BREAST TOMOSYNTHESIS BI: CPT

## 2024-08-19 DIAGNOSIS — E27.1 ADDISON'S DISEASE (H): ICD-10-CM

## 2024-08-19 DIAGNOSIS — E03.9 HYPOTHYROIDISM, UNSPECIFIED TYPE: ICD-10-CM

## 2024-08-19 RX ORDER — HYDROCORTISONE 5 MG/1
TABLET ORAL
Qty: 405 TABLET | Refills: 1 | OUTPATIENT
Start: 2024-08-19

## 2024-08-19 NOTE — TELEPHONE ENCOUNTER
6 month supply sent 4/19/24      Current regimen: Hydrocortisone 10 mg every morning, 10 mg at noon, 2.5 mg at 3 PM (using 5 mg tablets).  Fludrocortisone 0.15 mg daily.     Requested Prescriptions   Pending Prescriptions Disp Refills    hydrocortisone (CORTEF) 5 MG tablet 405 tablet 1     Sig: 10 mg (2 tab) PO Q AM, 10 mg (2 tab) PO q noon, 2.5 mg (HALF tab) PO mid-afternoon around 3 PM. Double or triple dose for 3 days for moderate illness as needed.       There is no refill protocol information for this order

## 2024-11-06 ENCOUNTER — OFFICE VISIT (OUTPATIENT)
Dept: OBGYN | Facility: CLINIC | Age: 49
End: 2024-11-06
Payer: COMMERCIAL

## 2024-11-06 VITALS
SYSTOLIC BLOOD PRESSURE: 136 MMHG | WEIGHT: 130.8 LBS | HEART RATE: 60 BPM | DIASTOLIC BLOOD PRESSURE: 83 MMHG | OXYGEN SATURATION: 100 % | BODY MASS INDEX: 21.79 KG/M2 | HEIGHT: 65 IN

## 2024-11-06 DIAGNOSIS — E03.9 HYPOTHYROIDISM, UNSPECIFIED TYPE: ICD-10-CM

## 2024-11-06 DIAGNOSIS — E27.1 ADDISON'S DISEASE (H): ICD-10-CM

## 2024-11-06 DIAGNOSIS — Z13.1 SCREENING FOR DIABETES MELLITUS: ICD-10-CM

## 2024-11-06 DIAGNOSIS — Z13.0 SCREENING, ANEMIA, DEFICIENCY, IRON: ICD-10-CM

## 2024-11-06 DIAGNOSIS — Z01.419 ENCOUNTER FOR GYNECOLOGICAL EXAMINATION WITHOUT ABNORMAL FINDING: Primary | ICD-10-CM

## 2024-11-06 DIAGNOSIS — Z13.220 SCREENING FOR LIPID DISORDERS: ICD-10-CM

## 2024-11-06 LAB
ANION GAP SERPL CALCULATED.3IONS-SCNC: 13 MMOL/L (ref 7–15)
BUN SERPL-MCNC: 7.7 MG/DL (ref 6–20)
CALCIUM SERPL-MCNC: 9.2 MG/DL (ref 8.8–10.4)
CHLORIDE SERPL-SCNC: 100 MMOL/L (ref 98–107)
CHOLEST SERPL-MCNC: 207 MG/DL
CREAT SERPL-MCNC: 0.88 MG/DL (ref 0.51–0.95)
EGFRCR SERPLBLD CKD-EPI 2021: 81 ML/MIN/1.73M2
ERYTHROCYTE [DISTWIDTH] IN BLOOD BY AUTOMATED COUNT: 13.4 % (ref 10–15)
EST. AVERAGE GLUCOSE BLD GHB EST-MCNC: 103 MG/DL
FASTING STATUS PATIENT QL REPORTED: YES
FASTING STATUS PATIENT QL REPORTED: YES
GLUCOSE SERPL-MCNC: 86 MG/DL (ref 70–99)
HBA1C MFR BLD: 5.2 % (ref 0–5.6)
HCO3 SERPL-SCNC: 25 MMOL/L (ref 22–29)
HCT VFR BLD AUTO: 42.4 % (ref 35–47)
HDLC SERPL-MCNC: 92 MG/DL
HGB BLD-MCNC: 13.3 G/DL (ref 11.7–15.7)
LDLC SERPL CALC-MCNC: 74 MG/DL
MCH RBC QN AUTO: 30.2 PG (ref 26.5–33)
MCHC RBC AUTO-ENTMCNC: 31.4 G/DL (ref 31.5–36.5)
MCV RBC AUTO: 96 FL (ref 78–100)
NONHDLC SERPL-MCNC: 115 MG/DL
PLATELET # BLD AUTO: 266 10E3/UL (ref 150–450)
POTASSIUM SERPL-SCNC: 3.6 MMOL/L (ref 3.4–5.3)
RBC # BLD AUTO: 4.4 10E6/UL (ref 3.8–5.2)
SODIUM SERPL-SCNC: 138 MMOL/L (ref 135–145)
TRIGL SERPL-MCNC: 206 MG/DL
TSH SERPL DL<=0.005 MIU/L-ACNC: 2.26 UIU/ML (ref 0.3–4.2)
WBC # BLD AUTO: 13.7 10E3/UL (ref 4–11)

## 2024-11-06 PROCEDURE — 90471 IMMUNIZATION ADMIN: CPT | Performed by: OBSTETRICS & GYNECOLOGY

## 2024-11-06 PROCEDURE — 99000 SPECIMEN HANDLING OFFICE-LAB: CPT | Performed by: OBSTETRICS & GYNECOLOGY

## 2024-11-06 PROCEDURE — 83036 HEMOGLOBIN GLYCOSYLATED A1C: CPT | Performed by: OBSTETRICS & GYNECOLOGY

## 2024-11-06 PROCEDURE — 99396 PREV VISIT EST AGE 40-64: CPT | Mod: 25 | Performed by: OBSTETRICS & GYNECOLOGY

## 2024-11-06 PROCEDURE — 80048 BASIC METABOLIC PNL TOTAL CA: CPT | Performed by: OBSTETRICS & GYNECOLOGY

## 2024-11-06 PROCEDURE — 84443 ASSAY THYROID STIM HORMONE: CPT | Performed by: OBSTETRICS & GYNECOLOGY

## 2024-11-06 PROCEDURE — 90746 HEPB VACCINE 3 DOSE ADULT IM: CPT | Performed by: OBSTETRICS & GYNECOLOGY

## 2024-11-06 PROCEDURE — 82024 ASSAY OF ACTH: CPT | Performed by: OBSTETRICS & GYNECOLOGY

## 2024-11-06 PROCEDURE — 85027 COMPLETE CBC AUTOMATED: CPT | Performed by: OBSTETRICS & GYNECOLOGY

## 2024-11-06 PROCEDURE — 84244 ASSAY OF RENIN: CPT | Mod: 90 | Performed by: OBSTETRICS & GYNECOLOGY

## 2024-11-06 PROCEDURE — 80061 LIPID PANEL: CPT | Performed by: OBSTETRICS & GYNECOLOGY

## 2024-11-06 RX ORDER — NORGESTIMATE AND ETHINYL ESTRADIOL 0.25-0.035
1 KIT ORAL DAILY
Qty: 112 TABLET | Refills: 4 | Status: SHIPPED | OUTPATIENT
Start: 2024-11-06

## 2024-11-06 NOTE — PATIENT INSTRUCTIONS
[facility]. Org  to schedule next dose of hep B    Flow murmur noted on exam today, disappears when holding your breath

## 2024-11-06 NOTE — PROGRESS NOTES
Ashia is a 49 year old  female who presents for annual exam.     Menses are regular q 28-30 days and normal lasting 6 days.  Menses flow: normal.  Patient's last menstrual period was 10/08/2024.. Using oral contraceptives for contraception.  She is not currently considering pregnancy.  Besides routine health maintenance, she has no other health concerns today . Cycles are good and normal, no BTB.  Son is 16 and doing well, has not passed 's exam. Daughter is 14 and doing well. Middle school.    GYNECOLOGIC HISTORY:  Menarche:   Ashia is sexually active with 1male partner(s) and is currently in monogamous relationship.    History sexually transmitted infections:No STD history  STI testing offered?  Declined  [place] exposure: Unknown  History of abnormal Pap smear: No - age 32- 66 PAP with HPV every 5 years recommended  Family history of breast CA: Yes (Please explain): mgm  Family history of uterine/ovarian CA: No    Family history of colon CA: No    HEALTH MAINTENANCE:  Cholesterol: (  Cholesterol   Date Value Ref Range Status   09/15/2023 185 <200 mg/dL Final   2022 216 (H) <200 mg/dL Final   2021 231 (H) <200 mg/dL Final     Comment:     Desirable:       <200 mg/dl   2019 207 (H) <200 mg/dL Final     Comment:     Desirable:       <200 mg/dl    History of abnormal lipids: Yes  Mammo: 26 . History of abnormal Mammo: No.  Regular Self Breast Exams: Yes  Calcium/Vitamin D intake: source:  dairy, dietary supplement(s) Adequate? Yes  TSH: (  TSH   Date Value Ref Range Status   2024 1.73 0.30 - 4.20 uIU/mL Final   2022 2.94 0.30 - 5.00 uIU/mL Final   2022 1.48 0.40 - 4.00 mU/L Final   2019 2.52 0.40 - 4.00 mU/L Final    )  Pap; (  Lab Results   Component Value Date    PAP NIL 2017    PAP NIL 2013    PAP NIL 2010    )    HISTORY:  OB History    Para Term  AB Living   3 2 2 0 1 2   SAB IAB Ectopic Multiple Live Births   1 0 0 0 2     "  # Outcome Date GA Lbr Walt/2nd Weight Sex Type Anes PTL Lv   3 Term 10/01/10 39w4d  2.835 kg (6 lb 4 oz) F   N DELBERT      Birth Comments: IOL for IUGR, had prolapsed arm      Name: Ibeth      Apgar1: 9  Apgar5: 9   2 Term 08 38w5d  2.466 kg (5 lb 7 oz) M  EPI N DELBERT      Birth Comments: induced for IUGR      Name: Darek      Apgar1: 8  Apgar5: 9   1 SAB              Past Medical History:   Diagnosis Date    Allergic rhinitis, cause unspecified     Chronic lymphocytic thyroiditis     Corticoadrenal insufficiency     Dr. Freddie AGUILAR of [place] [facility]    Extrinsic asthma, unspecified     Other chronic allergic conjunctivitis     Allergic conjunctivitis    Other forms of retinal detachment(361.89)     PONV (postoperative nausea and vomiting)     Unspecified hypothyroidism      Past Surgical History:   Procedure Laterality Date    addisonian crisis      COLONOSCOPY N/A 3/8/2021    Procedure: COLONOSCOPY, WITH POLYPECTOMY;  Surgeon: Natalia Bangura MD;  Location: [facility] OR    DILATION AND CURETTAGE, OPERATIVE HYSTEROSCOPY WITH MORCELLATOR, COMBINED N/A 2023    Procedure: HYSTEROSCOPY, WITH DILATION AND CURETTAGE OF UTERUS USING MORCELLATOR;  Surgeon: Eveline Lopez MD;  Location: [facility] OR    HC TOOTH EXTRACTION W/FORCEP      ZZC REPAIR DETACH RETINA,SCLERAL BUCKLE       Family History   Problem Relation Age of Onset    Hypertension Mother     Arthritis Mother     Osteopenia Mother     Colon Polyps Mother     Cancer Father         basal cell cancer    Cerebrovascular Disease Father         CVA    Hypertension Father     Other - See Comments Father         stroke    Basal cell carcinoma Father     Colon Polyps Sister 42    Depression Sister     Asthma Sister     Asthma Sister     Asthma Sister     Cardiovascular Maternal Grandmother         \"heart troubles\"    Breast Cancer Maternal Grandmother     Osteoporosis Maternal Grandmother     Asthma Paternal Grandmother     Cerebrovascular Disease Paternal " Grandmother     Prostate Cancer Paternal Grandfather     Basal cell carcinoma Paternal Uncle     Depression Sister     Asthma Sister     Diabetes No family hx of      Social History     Socioeconomic History    Marital status:      Spouse name: Emmett    Number of children: 2   Occupational History     Employer: [employer]   Tobacco Use    Smoking status: Never     Passive exposure: Never    Smokeless tobacco: Never   Vaping Use    Vaping status: Never Used   Substance and Sexual Activity    Alcohol use: Yes     Comment: 1-2 per week    Drug use: No    Sexual activity: Yes     Partners: Male     Birth control/protection: Pill   Other Topics Concern    Parent/sibling w/ CABG, MI or angioplasty before 65F 55M? No     Social Drivers of Health     Financial Resource Strain: Low Risk  (11/14/2023)    Financial Resource Strain     Within the past 12 months, have you or your family members you live with been unable to get utilities (heat, electricity) when it was really needed?: No   Food Insecurity: Low Risk  (11/14/2023)    Food Insecurity     Within the past 12 months, did you worry that your food would run out before you got money to buy more?: No     Within the past 12 months, did the food you bought just not last and you didn t have money to get more?: No   Transportation Needs: Low Risk  (11/14/2023)    Transportation Needs     Within the past 12 months, has lack of transportation kept you from medical appointments, getting your medicines, non-medical meetings or appointments, work, or from getting things that you need?: No   Interpersonal Safety: Low Risk  (11/17/2023)    Interpersonal Safety     Do you feel physically and emotionally safe where you currently live?: Yes     Within the past 12 months, have you been hit, slapped, kicked or otherwise physically hurt by someone?: No     Within the past 12 months, have you been humiliated or emotionally abused in other ways by your partner or ex-partner?: No  "  Housing Stability: Low Risk  (11/14/2023)    Housing Stability     Do you have housing? : Yes     Are you worried about losing your housing?: No       Current Outpatient Medications:     norgestimate-ethinyl estradiol (ORTHO-CYCLEN) 0.25-35 MG-MCG tablet, Take 1 tablet by mouth daily. Active tablets only for prevention of menses, Disp: 112 tablet, Rfl: 4    Cholecalciferol (VITAMIN D) 2000 UNIT tablet, Take 1 tablet by mouth daily., Disp: , Rfl:     doxylamine (UNISOM) 25 MG TABS tablet, Take 25 mg by mouth At Bedtime, Disp: , Rfl:     fludrocortisone (FLORINEF) 0.1 MG tablet, Take 1.5 tablets (0.15 mg) by mouth daily, Disp: 135 tablet, Rfl: 1    hydrocortisone (CORTEF) 5 MG tablet, 10 mg (2 tab) PO Q AM, 10 mg (2 tab) PO q noon, 2.5 mg (HALF tab) PO mid-afternoon around 3 PM. Double or triple dose for 3 days for moderate illness as needed., Disp: 405 tablet, Rfl: 1    levothyroxine (SYNTHROID/LEVOTHROID) 125 MCG tablet, Take 1 tablet (125 mcg) by mouth daily, Disp: 90 tablet, Rfl: 1    norethindrone-ethinyl estradiol (NECON) 0.5-35 MG-MCG tablet, Take 1 tablet by mouth daily Active tablets only for prevention of menses, Disp: 112 tablet, Rfl: 4    WOMENS DAILY MULTIVITAMIN OR TABS, 1 TABLET DAILY, Disp: 30, Rfl: 0     Allergies   Allergen Reactions    Sulfa Antibiotics Hives    Pcn [Penicillins] Rash       Past medical, surgical, social and family history were reviewed and [facility] in [facility].    EXAM:  /83   Pulse 60   Ht 1.638 m (5' 4.5\")   Wt 59.3 kg (130 lb 12.8 oz)   LMP 10/08/2024   SpO2 100%   BMI 22.11 kg/m     BMI: Body mass index is 22.11 kg/m .  Constitutional: healthy, alert and no distress  Head: Normocephalic. No masses, lesions, tenderness or abnormalities  Neck: Neck supple. Trachea midline. No adenopathy. Thyroid symmetric, normal size.   Cardiovascular: RRR. Flow murmur noted on exam, stops when holding her breath  Respiratory: Negative.   Breast: Breasts reveal mild symmetric " fibrocystic densities, but there are no dominant, discrete, fixed or suspicious masses found.  Gastrointestinal: Abdomen soft, non-tender, non-distended. No masses, organomegaly.  : deferred  Musculoskeletal: extremities normal  Skin: no suspicious lesions or rashes  Psychiatric: Affect appropriate, cooperative,mentation appears normal.     COUNSELING:   Reviewed preventive health counseling, as reflected in patient instructions       Contraception   reports that she has never smoked. She has never been exposed to tobacco smoke. She has never used smokeless tobacco.    Body mass index is 22.11 kg/m .    FRAX Risk Assessment    ASSESSMENT:  49 year old female with satisfactory annual exam  (Z01.419) Encounter for gynecological examination without abnormal finding  (primary encounter diagnosis)  Comment: on OCP  Plan: norgestimate-ethinyl estradiol (ORTHO-CYCLEN)         0.25-35 MG-MCG tablet        Wants to go back to OCP that she was on prior as can only go 3-4 weeks continuously on this pill. 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

## 2024-11-07 LAB — ACTH PLAS-MCNC: 14 PG/ML

## 2024-11-09 LAB — RENIN PLAS-CCNC: 2.9 NG/ML/HR

## 2024-11-18 DIAGNOSIS — E03.9 HYPOTHYROIDISM, UNSPECIFIED TYPE: ICD-10-CM

## 2024-11-18 DIAGNOSIS — E27.1 ADDISON'S DISEASE (H): Primary | ICD-10-CM

## 2024-12-26 DIAGNOSIS — E03.9 HYPOTHYROIDISM, UNSPECIFIED TYPE: ICD-10-CM

## 2024-12-26 DIAGNOSIS — E27.1 ADDISON'S DISEASE (H): ICD-10-CM

## 2024-12-26 RX ORDER — LEVOTHYROXINE SODIUM 125 UG/1
125 TABLET ORAL DAILY
Qty: 90 TABLET | Refills: 1 | Status: SHIPPED | OUTPATIENT
Start: 2024-12-26

## 2024-12-26 NOTE — TELEPHONE ENCOUNTER
Refill request from [facility]#[id number]    Medication: Levothyroxine 125mcg  Last Refill: 9/25/24  Last OV: 4/19/24  Last lab: 11/6/24  Future OV: 4/25/25

## 2024-12-26 NOTE — TELEPHONE ENCOUNTER
Requested Prescriptions   Pending Prescriptions Disp Refills    levothyroxine (SYNTHROID/LEVOTHROID) 125 MCG tablet 90 tablet 1     Sig: Take 1 tablet (125 mcg) by mouth daily.       Thyroid Protocol Passed - 12/26/2024  1:38 PM        Passed - Patient is 12 years or older        Passed - Medication is active on med list        Passed - Recent (12 mo) or future (90 days) visit within the authorizing provider's specialty     The patient must have completed an in-person or virtual visit within the past 12 months or has a future visit scheduled within the next 90 days with the authorizing provider s specialty.  Urgent care and e-visits do not qualify as an office visit for this protocol.          Passed - Medication indicated for associated diagnosis     Medication is associated with one or more of the following diagnoses:  Hypothyroidism  Thyroid stimulating hormone suppression therapy  Thyroid cancer  Acquired atrophy of thyroid          Passed - Normal TSH on file in past 12 months     Recent Labs   Lab Test 11/06/24  0920   TSH 2.26              Passed - No active pregnancy on record     If patient is pregnant or has had a positive pregnancy test, please check TSH.          Passed - No positive pregnancy test in past 12 months     If patient is pregnant or has had a positive pregnancy test, please check TSH.

## 2025-04-07 DIAGNOSIS — E27.1 ADDISON'S DISEASE (H): ICD-10-CM

## 2025-04-07 DIAGNOSIS — E03.9 HYPOTHYROIDISM, UNSPECIFIED TYPE: ICD-10-CM

## 2025-04-07 RX ORDER — HYDROCORTISONE 5 MG/1
TABLET ORAL
Qty: 135 TABLET | Refills: 0 | Status: SHIPPED | OUTPATIENT
Start: 2025-04-07

## 2025-04-07 NOTE — TELEPHONE ENCOUNTER
Refill request from [facility]    Medication: Hydrocortisone 5mg  Last Refill: 4/19/24  Last OV: 4/19/24  Last Lab: 11/6/24  Future OV: 4/25/25

## 2025-04-07 NOTE — TELEPHONE ENCOUNTER
Requested Prescriptions   Signed Prescriptions Disp Refills    hydrocortisone (CORTEF) 5 MG tablet 135 tablet 0     Sig: 10 mg (2 tab) PO Q AM, 10 mg (2 tab) PO q noon, 2.5 mg (HALF tab) PO mid-afternoon around 3 PM. Double or triple dose for 3 days for moderate illness as needed.       There is no refill protocol information for this order

## 2025-05-30 ENCOUNTER — TRANSFERRED RECORDS (OUTPATIENT)
Dept: HEALTH INFORMATION MANAGEMENT | Facility: CLINIC | Age: 50
End: 2025-05-30
Payer: COMMERCIAL

## 2025-07-03 ENCOUNTER — TRANSFERRED RECORDS (OUTPATIENT)
Dept: ADMINISTRATIVE | Facility: CLINIC | Age: 50
End: 2025-07-03
Payer: COMMERCIAL

## 2025-07-08 PROBLEM — D12.6 ADENOMATOUS POLYP OF COLON: Status: ACTIVE | Noted: 2025-07-08

## 2025-07-08 NOTE — PROCEDURES
[facility]   [address], [address], [place] [address]     Patient Name: Ashia Ying  Gender:  Female  Exam Date: 07/03/2025 Visit Number:  [id number]  Age: 49 Years YOB: 1975  Attending MD: Prabha Little MD Medical Record#:  [id number]    Procedure: Colonoscopy   Indications: Previous adenomatous polyp(s)       Family history   of polyps    Referring MD: Referral Self  Primary MD:      Eveline Lopez MD  Medications: Admitting Medications:   0.9% Normal Saline at [facility]   Intra Procedure Medications:   Patient received monitored anesthesia care.     Complications: No immediate complications  ______________________________________________________________________________  Procedure:   An examination of the heart and lungs was performed and found to be within acceptable limits.  .  The patient was therefore deemed a reasonable candidate for endoscopy and sedation.   The risks and benefits of the procedure were explained to the patient.After obtaining informed consent, the patient received monitored anesthesia care and I passed the scope   without difficulty via the rectum to the cecum.  The appendiceal orifice and ic valve were identified.  The scope was retroflexed during the examination  The quality of the prep was good  (Miralax/Gatorade/2 tablets Bisacodyl/Magnesium Citrate).    This was a complete examination throughout the entire colon.    Findings:    Polyp location: ascending colon.  Quantity: 1.  Size: 2 mm.  Polyp shape:  sessile.         Maneuver: polypectomy was performed with a cold biopsy forceps.       Removal:  complete.  Retrieval: complete.  Bleeding: none.    Tortuous  Location - entire colon.  Remainder of the exam is normal.    Impression:  Colorectal polyp detected on colonoscopy  MD impression comments:  1 small polyp removed    Preliminary Plan:  Repeat colonoscopy in 5 years  Pathology Results:  A: COLON, ASCENDING, POLYP:           1. Tubular  adenoma           2. Negative for high grade dysplasia           3. Per the colonoscopy report:               a. Polyp size: 2 mm               b. Resection: Complete               c. Retrieval: Complete      MICROSCOPIC  A: Performed     Electronically signed by: Ray Young MD    Interpreted at [facility], [address] [place] [id number]    Orders    Instruction(s)/Education:  Instruction/Education Timeframe Assessment   Colon Cancer Prevention  K63.5   Colon Polyps  K63.5       Final Plan:  Repeat colonoscopy in 5 years.    We will attempt to contact you at appropriate intervals via [place] mail.  We may not be able to find you or contact you at that time, therefore you should know that the responsibility for following our recommendation rests with you.  If you don't hear from us at the time your procedure is due, please contact our office to schedule an appointment.  If your contact information should change, please contact our office so that we can update your record.      _Electronically signed by:___________________  Prabha Little MD                 07/03/2025    cc: Eveline Lopez MD  cc: Eveline Lopez MD

## 2025-07-09 ENCOUNTER — PATIENT OUTREACH (OUTPATIENT)
Dept: GASTROENTEROLOGY | Facility: CLINIC | Age: 50
End: 2025-07-09
Payer: COMMERCIAL

## 2025-08-29 ENCOUNTER — ANCILLARY PROCEDURE (OUTPATIENT)
Dept: MAMMOGRAPHY | Facility: CLINIC | Age: 50
End: 2025-08-29
Attending: OBSTETRICS & GYNECOLOGY
Payer: COMMERCIAL

## 2025-08-29 DIAGNOSIS — Z12.31 VISIT FOR SCREENING MAMMOGRAM: ICD-10-CM

## 2025-08-29 PROCEDURE — 77067 SCR MAMMO BI INCL CAD: CPT

## (undated) DEVICE — ENDO SNARE EXACTO COLD 9MM LOOP 2.4MMX230CM 00711115

## (undated) DEVICE — PAD CHUX UNDERPAD 30X36" P3036C

## (undated) DEVICE — SOL WATER IRRIG 1000ML BOTTLE 2F7114

## (undated) DEVICE — LINEN GOWN X4 5410

## (undated) DEVICE — SOL NACL 0.9% IRRIG 1000ML BOTTLE 2F7124

## (undated) DEVICE — DEVICE TISSUE REMOVAL HYSTEROSCOPIC MYOSURE LITE 30-401LITE

## (undated) DEVICE — KIT ENDO TURNOVER/PROCEDURE CARRY-ON 101822

## (undated) DEVICE — ENDO TRAP POLYP E-TRAP 00711099

## (undated) DEVICE — SPECIMEN CONTAINER 3OZ W/FORMALIN 59901

## (undated) DEVICE — Device

## (undated) DEVICE — ENDO FORCEP SPIKED SERRATED SHAFT JUMBO 239CM G56998

## (undated) DEVICE — KIT PROCEDURE FLUENT IN/OUT FLOWPAK TISS TRAP FLT-112S

## (undated) DEVICE — GLOVE BIOGEL PI ULTRATOUCH G SZ 6.0 42160

## (undated) DEVICE — SEAL SET MYOSURE ROD LENS SCOPE SINGLE USE 40-902

## (undated) DEVICE — SOL NACL 0.9% IRRIG 3000ML BAG 2B7477

## (undated) DEVICE — SUCTION MANIFOLD NEPTUNE 2 SYS 1 PORT 702-025-000

## (undated) DEVICE — SOLIDIFIER (USE FOR UP TO 1500CC) MSOLID1500

## (undated) DEVICE — GOWN IMPERVIOUS 2XL BLUE

## (undated) DEVICE — TUBING SUCTION 12"X1/4" N612

## (undated) DEVICE — ESU HOLDER LAP INST DISP PURPLE LONG 330MM H-PRO-330

## (undated) DEVICE — GOWN XLG DISP 9545

## (undated) DEVICE — PREP DYNA-HEX 4% CHG SCRUB 4OZ BOTTLE MDS098710

## (undated) DEVICE — LINEN TOWEL PACK X5 5464

## (undated) DEVICE — GLOVE BIOGEL PI MICRO INDICATOR UNDERGLOVE SZ 6.5 48965

## (undated) RX ORDER — ACETAMINOPHEN 325 MG/1
TABLET ORAL
Status: DISPENSED
Start: 2023-12-11

## (undated) RX ORDER — FENTANYL CITRATE 50 UG/ML
INJECTION, SOLUTION INTRAMUSCULAR; INTRAVENOUS
Status: DISPENSED
Start: 2021-03-08

## (undated) RX ORDER — FENTANYL CITRATE 50 UG/ML
INJECTION, SOLUTION INTRAMUSCULAR; INTRAVENOUS
Status: DISPENSED
Start: 2023-12-11

## (undated) RX ORDER — LIDOCAINE HYDROCHLORIDE 10 MG/ML
INJECTION, SOLUTION EPIDURAL; INFILTRATION; INTRACAUDAL; PERINEURAL
Status: DISPENSED
Start: 2023-12-11